# Patient Record
Sex: FEMALE | Race: OTHER | HISPANIC OR LATINO | Employment: FULL TIME | URBAN - METROPOLITAN AREA
[De-identification: names, ages, dates, MRNs, and addresses within clinical notes are randomized per-mention and may not be internally consistent; named-entity substitution may affect disease eponyms.]

---

## 2018-10-30 ENCOUNTER — OFFICE VISIT (OUTPATIENT)
Dept: FAMILY MEDICINE CLINIC | Facility: CLINIC | Age: 38
End: 2018-10-30

## 2018-10-30 VITALS
HEIGHT: 63 IN | OXYGEN SATURATION: 96 % | WEIGHT: 160 LBS | BODY MASS INDEX: 28.35 KG/M2 | DIASTOLIC BLOOD PRESSURE: 82 MMHG | SYSTOLIC BLOOD PRESSURE: 122 MMHG | TEMPERATURE: 98.1 F | RESPIRATION RATE: 16 BRPM | HEART RATE: 90 BPM

## 2018-10-30 DIAGNOSIS — Z00.01 ENCOUNTER FOR WELL ADULT EXAM WITH ABNORMAL FINDINGS: Primary | ICD-10-CM

## 2018-10-30 DIAGNOSIS — E66.3 OVERWEIGHT (BMI 25.0-29.9): ICD-10-CM

## 2018-10-30 DIAGNOSIS — R73.9 HYPERGLYCEMIA: ICD-10-CM

## 2018-10-30 DIAGNOSIS — Z23 NEEDS FLU SHOT: ICD-10-CM

## 2018-10-30 DIAGNOSIS — E78.5 HYPERLIPIDEMIA, UNSPECIFIED HYPERLIPIDEMIA TYPE: ICD-10-CM

## 2018-10-30 PROCEDURE — 99385 PREV VISIT NEW AGE 18-39: CPT | Performed by: NURSE PRACTITIONER

## 2018-10-30 NOTE — ASSESSMENT & PLAN NOTE
Patient is here for physical exam we find this visit without any distress or abnormalities  We recommended exercise at least three and 0 5 hours per week and healthy diet with a low carbohydrate and low saturated fat   Began to follow up in one year for regular physical exams

## 2018-10-30 NOTE — ASSESSMENT & PLAN NOTE
I counseled patient about healthy BMI with benefit of avoiding weight gain to prevent health risks  I recommended increased intake of fruits, vegetables, yogurt, whole grains, and diet soda  Also advised patient on increased physical activity and sleeping 6-8 hours/night  Limiting trans fat, saturated fat, fried foods, sugar and adding low fat foods in her diet

## 2018-10-30 NOTE — PROGRESS NOTES
Assessment/Plan:    Encounter for well adult exam with abnormal findings  Patient is here for physical exam we find this visit without any distress or abnormalities  We recommended exercise at least three and 0 5 hours per week and healthy diet with a low carbohydrate and low saturated fat  Began to follow up in one year for regular physical exams    Overweight (BMI 25 0-29  9)  I counseled patient about healthy BMI with benefit of avoiding weight gain to prevent health risks  I recommended increased intake of fruits, vegetables, yogurt, whole grains, and diet soda  Also advised patient on increased physical activity and sleeping 6-8 hours/night  Limiting trans fat, saturated fat, fried foods, sugar and adding low fat foods in her diet  Diagnoses and all orders for this visit:    Encounter for well adult exam with abnormal findings    Overweight (BMI 25 0-29  9)    Hyperlipidemia, unspecified hyperlipidemia type  -     CBC and differential; Future  -     Lipid panel; Future  -     TSH, 3rd generation with Free T4 reflex; Future    Hyperglycemia  -     Comprehensive metabolic panel; Future  -     Hemoglobin A1C; Future    Needs flu shot  -     SYRINGE/SINGLE-DOSE VIAL: influenza vaccine, [id number], quadrivalent, 0 5 mL, preservative-free, for patients 3+ yr (FLUZONE)          Subjective:      Patient ID: Celinda Riedel is a 41 y o  female  Physical exam  41year old female patient here for a physical exam with bloodwork  Currently has no insurance  Denies any complaints today  The following portions of the patient's history were reviewed and updated as appropriate: allergies, current medications, past family history, past medical history, past social history, past surgical history and problem list     Review of Systems   Constitutional: Negative  HENT: Negative  Eyes: Negative  Respiratory: Negative  Cardiovascular: Negative  Gastrointestinal: Negative  Endocrine: Negative  Genitourinary: Negative  Musculoskeletal: Negative  Skin: Negative  Allergic/Immunologic: Negative  Neurological: Negative  Hematological: Negative  Psychiatric/Behavioral: Negative  Objective:      /82 (BP Location: Left arm, Patient Position: Sitting, Cuff Size: Adult)   Pulse 90   Temp 98 1 °F (36 7 °C) (Oral)   Resp 16   Ht 5' 3" (1 6 m)   Wt 72 6 kg (160 lb)   LMP 10/13/2018 (Approximate)   SpO2 96%   Breastfeeding? No   BMI 28 34 kg/m²          Physical Exam   Constitutional: She is oriented to person, place, and time  She appears well-developed and well-nourished  HENT:   Head: Normocephalic and atraumatic  Right Ear: Hearing, tympanic membrane, external ear and ear canal normal    Left Ear: Hearing, external ear and ear canal normal  A middle ear effusion is present  No decreased hearing is noted  Nose: Nose normal    Mouth/Throat: Uvula is midline, oropharynx is clear and moist and mucous membranes are normal    Eyes: Pupils are equal, round, and reactive to light  Conjunctivae and EOM are normal    Neck: Normal range of motion  Neck supple  Cardiovascular: Normal rate, regular rhythm and normal heart sounds  Pulmonary/Chest: Effort normal and breath sounds normal    Abdominal: Soft  Bowel sounds are normal    Musculoskeletal: Normal range of motion  Neurological: She is alert and oriented to person, place, and time  She has normal reflexes  Skin: Skin is warm and dry  Psychiatric: She has a normal mood and affect  Thought content normal    Nursing note and vitals reviewed

## 2018-10-30 NOTE — PATIENT INSTRUCTIONS
Visita de bienestar para los adultos   LO QUE NECESITA SABER:   ¿Qué es adele visita de bienestar? Jay Jay Charles de bienestar es cuando usted acude con un médico para que le pietro exámenes de detección de problemas de [place]  También puede obtener asesoramiento sobre cómo mantenerse saludable  Anote gio preguntas para que se acuerde de hacerlas  Pregunte a horn médico con qué frecuencia debería realizarse adele visita de bienestar  ¿Qupe sucede en adele visita de bienestar? Horn médico le preguntará sobre horn lydia y horn historia familiar [id number] [facility]  [place] incluye presión arterial sidney, enfermedades del corazón y cáncer  El médico le preguntará si tiene síntomas que le preocupen, si [facility] y [place] de ánimo  También se le preguntará acerca del uso de medicamentos, suplementos, alimentos y alcohol  Es posible que le pietro cualquiera de lo siguiente:  · Horn peso  será revisado  Es posible que [employer] midan horn altura para calcular horn índice de masa corporal [facility])  El [facility] indica si tiene un peso saludable  · Se verificarán horn presión arterial  y frecuencia cardíaca  También pueden revisar horn temperatura  · Exámenes de [place] y [facility]  se podría realizar  Se podrían realizar exámenes de yusra para revisar los niveles de [facility]  Los niveles anormales de colesterol aumentan el riesgo de enfermedad del corazón y accidente cerebrovascular  Puede que también necesite adele prueba de yusra u orina para revisar si tiene diabetes si usted está en mayor riesgo  Las pruebas de orina pueden hacerse para buscar signos de adele infección o adele enfermedad renal      · Un examen físico  incluye la comprobación de gio latidos del corazón y los pulmones con un estetoscopio  Horn médico también podría revisarle la piel para buscar daños causados por el sol  · Pruebas de detección  podría recomendarse  Se realiza un examen de detección para detectar enfermedades que pueden no causar síntomas   Los exámenes de Natalia 'DARRICK' Us necesite dependen de horn edad, género, antecedentes familiares y hábitos de dayana  Por ejemplo, podrían recomendarle la exploración selectiva colorrectal si tiene 48 años o más  ¿Qué exámenes de detección necesito si soy adele kadeem? · El examen de Papanicolaou  se utiliza para detectar cáncer de ananth uterino  El examen del Papanicolaou por lo general se realiza entre 3 a 5 años dependiendo de horn edad  Puede necesitarlo más a menudo si usted ha tenido [employer] de la prueba de Papanicolaou en el pasado  Pregunte a horn médico con qué frecuencia debería realizarse un examen de Papanicolaou  · Adele mamografía  es adele radiografía de goi senos para detectar cáncer de mama  Los expertos recomiendan [address] cada 2 años empezando a los 48 años de [place]  Es probable que usted necesite [address] a los 52 años o antes si tiene riesgo alto de cáncer de seno  Hable con horn médico sobre cuándo debe empezar con gio mamografías y con cuánta frecuencia las necesita  ¿Qué vacunas podría necesitar? · Debe recibir Ifeoma Flurry vacuna contra la influenza  todos los [address] [place] [address]  La vacuna contra la influenza protege de la gripe  Varios tipos de virus causan la influenza  Debido a que los virus [place] con el [place], es necesaria la producción de nuevas vacunas cada año  · Debe recibir Ifeoma Flurry vacuna de refuerzo contra el tétanos-difteria (Td)  cada 10 años  Esta vacuna protege contra el tétanos y la difteria  El tétanos es adele infección severa que puede causar trismo y espasmos musculares dolorosos  La difteria es adele infección bacterial grave que produce adele cubierta gruesa en la parte de atrás de la boca y garganta  · Debe recibir la vacuna contra el virus del papiloma humano (VPH)  si usted es kadeem y [place] 19 y 32 años o es hombre y [place] 23 y 24 años y nunca la recibió  Esta vacuna protege contra la infección por VPH   El virus del papiloma humano o VPH es la infección más común que se transmite por contacto sexual  El virus del papiloma humano también podría provocar cáncer vaginal, del pene y del ano  · Debe recibir la vacuna antineumocócica  si tiene más de 72 años  La vacuna antineumocócica es adele inyección que se administra para protegerlo contra adele enfermedad neumocócica  La enfermedad neumocócica es adele infección causada por la bacteria neumocócica  La infección puede causar neumonía, meningitis o adele infección del oído  · Debe recibir la vacuna contra la culebrilla  si tiene [address] [place] [address],[address] [place] [address] o [place], incluso si pisano tenido culebrilla antes  La vacuna contra la culebrilla (herpes zóster) es adele inyección usada para proteger contra el virus zóster que causa la varicela  Danita es el mismo virus que causa la varicela  La culebrilla es un sarpullido doloroso que se desarrolla en personas que tuvieron varicela o pisano estado expuestas al virus  ¿Cómo puedo comer de manera saludable? [place] es un modelo para planear comidas sanas  Muestra los tipos y cantidades de alimentos que deberían ir en un plato  [place] Sa[place] y verduras representan alrededor de la mitad de horn plato y los granos y proteínas representan la otra mitad  Se incluye adele porción de productos lácteos al lado del plato  La cantidad de calorías y [address] de las porciones que usted necesita dependen de horn edad, [place], peso y altura  Los ejemplos de alimentos saludables son:  · Consuma adele variedad de verduras  breana las de color corin oscuro, gupta y [place]  Usted también puede incluir verduras enlatadas bajas en sodio (sal) y verduras congeladas sin mantequilla ni salsas LHICELGM  · Consuma adele variedad de fruta frescas , las frutas enlatadas en 100% de jugo , fruta [place] y trina secos  · Incluya granos enteros  Por lo menos la mitad de los granos que usted consume deben ser granos de ev integral  Por ejemplo, panes de grano entero, pasta integral, arroz integral y cereales de grano entero breana la prasad      · Consuma adele variedad de alimentos con proteínas breana mariscos (pescado y crustáceos), [place] y carne de ave sin piel (pavo y alanis)  Ejemplos de tequila magras incluyen pierna, paleta o lomo de puerco y arian, solomillo o, lomo de res y carne [place] de res extra [place]  Otros alimentos ricos en proteínas son los huevos y sustitutos de [place], frijoles, chícharos, productos de soya, nueces y semillas  · Elija productos lácteos bajos en grasa [employer] o del 1% o yogur, queso y requesón bajos en grasa  · Limite las grasas poco saludables,  breana la [place], la margarina dura y la [place]  ¿Qué cantidad de actividad física necesito? Realice adele actividad física por lo menos 30 minutos al día, la mayoría de los días de la [place]  Algunos de los ejercicios incluyen caminar, montar en bicicleta, bailar y nadar  Usted también puede realizar más actividad física usando las escaleras en vez de los ascensores o estacionarse más lejos cuando [place] a las tiendas  Incluya ejercicios para fortalecer los músculos 2 días a la semana  El ejercicio regular proporciona muchos beneficios para la lydia  [place] a controlar horn peso y [employer] riesgo de diabetes tipo 2, presión arterial sidney, enfermedad del corazón y accidente cerebrovascular  El ejercicio [employer] puede ayudar a mejorar horn estado de ánimo  Pregunte a horn médico acerca del mejor plan de ejercicio para usted  ¿Cuáles son Mehrdad Pale generales de lydia y seguridad que emre seguir? · No fume  La nicotina y otras sustancias químicas que contienen los cigarrillos y cigarros pueden dañar los pulmones  Pida información a horn médico si usted actualmente fuma y necesita ayuda para dejar de fumar  Los cigarrillos electrónicos o tabaco sin humo todavía contienen nicotina  Consulte con horn médico antes de [employer]  · Limite el consumo de alcohol  Un trago equivale a 12 onzas de cerveza, 5 onzas de vino o 1 onza y ½ de licor      · Baje de peso, si es necesario  El sobrepeso aumenta el riesgo de ciertas condiciones de [place]  Estos incluyen enfermedad del corazón, presión arterial sidney, diabetes tipo 2 y ciertos tipos de cáncer  · Proteja horn piel  No tome el sol ni use yady de bronceado  Use protector solar con un SPF 13 o mayor  Aplíquese el bloqueador por lo menos 15 minutos antes de que vaya a estar al [place]  Vuelva a aplicarse la crema solar cada 2 horas  Use ropa protectora, sombrero y lentes para el sol cuando se encuentre afuera  · Conduzca con seguridad  Use siempre horn cinturón de seguridad  Asegúrese que todos en el nick usan el cinturón de seguridad  Un cinturón de seguridad puede salvar horn dayana en troy de un accidente automovilístico  No use el celular cuando esté manejando  [place] puede hacer que se distraiga y causar un accidente  Es mejor que pare y se orille si necesita hacer adele Lesvia Glenn un texto  · Practique el sexo seguro  Use condones de látex si es sexualmente [place] y tiene más de [place] and [place]  Horn médico puede recomendar exámenes de detección de infecciones de transmisión sexual (ITS)  · Use un poppy, un chaleco salvavidas y unos implementos de protección  Siempre use un poppy al [employer] en bicicleta o motocicleta, esquiar o jugar deportes que podrían causar adele lesión en la jazlyn  Use implementos de protección cuando practique deportes  Use un chaleco salvavidas cuando esté en un bote o practicando actividades acuáticas  ACUERDOS SOBRE HORN CUIDADO:   Usted tiene el derecho de ayudar a planear horn cuidado  Aprenda todo lo que pueda sobre horn condición y breana darle tratamiento  Discuta gio opciones de tratamiento con gio médicos para decidir el cuidado que usted desea recibir  Usted siempre tiene el derecho de rechazar el tratamiento  Esta información es sólo para uso en educación  Horn intención no es darle un consejo médico sobre enfermedades o tratamientos   Colsulte con horn médico, enfermera o farmacéutico antes de seguir cualquier régimen médico para saber si es seguro y efectivo para usted  © 2017 [address] Information is for End User's use only and may not be sold, redistributed or otherwise used for commercial purposes  All illustrations and images included in CareNotes® are the copyrighted property of A D A M , Inc  or Shekhar Story

## 2018-11-05 ENCOUNTER — TELEPHONE (OUTPATIENT)
Dept: FAMILY MEDICINE CLINIC | Facility: CLINIC | Age: 38
End: 2018-11-05

## 2020-03-05 ENCOUNTER — OFFICE VISIT (OUTPATIENT)
Dept: FAMILY MEDICINE CLINIC | Facility: CLINIC | Age: 40
End: 2020-03-05

## 2020-03-05 VITALS
BODY MASS INDEX: 27.86 KG/M2 | OXYGEN SATURATION: 97 % | SYSTOLIC BLOOD PRESSURE: 100 MMHG | HEIGHT: 64 IN | DIASTOLIC BLOOD PRESSURE: 64 MMHG | WEIGHT: 163.2 LBS | HEART RATE: 77 BPM | TEMPERATURE: 98.7 F

## 2020-03-05 DIAGNOSIS — Z00.01 ENCOUNTER FOR WELL ADULT EXAM WITH ABNORMAL FINDINGS: Primary | ICD-10-CM

## 2020-03-05 DIAGNOSIS — E66.3 OVERWEIGHT (BMI 25.0-29.9): ICD-10-CM

## 2020-03-05 PROCEDURE — 99395 PREV VISIT EST AGE 18-39: CPT | Performed by: NURSE PRACTITIONER

## 2020-03-05 NOTE — ASSESSMENT & PLAN NOTE
Health risk assessment was discussed with patient also and the ways to stay healthier  Recommended a healthy diet and exercising frequently will help to control better patient's current chronic conditions; Immunizations, and the need to compliance with current CDC's recommendations  For follow up in 1 year for regular physical exams

## 2020-03-05 NOTE — PROGRESS NOTES
Assessment/Plan:    Encounter for well adult exam with abnormal findings  Health risk assessment was discussed with patient also and the ways to stay healthier  Recommended a healthy diet and exercising frequently will help to control better patient's current chronic conditions; Immunizations, and the need to compliance with current CDC's recommendations  For follow up in 1 year for regular physical exams  Overweight (BMI 25 0-29 9)  -To continue keeping active and diet modifications  Healthy food choices advised and joining a gym  Diagnoses and all orders for this visit:    Encounter for well adult exam with abnormal findings    Overweight (BMI 25 0-29  9)    Other orders  -     Cancel: HIV 1/2 Antigen/Antibody (4th Generation) w Reflex [facility]; Future  -     Cancel: Ambulatory referral to Obstetrics / Gynecology; Future          Subjective:      Patient ID: Caesar Toure is a 45 y o  female  45year old female patient here to have a physical exam  Patient denies any complaint today  Has been up to date on her dental and eye exams  LMP: 2/2020  Has been eating healthy but cannot lose weight  I advised joining a gym as well to help with weight loss journey  The following portions of the patient's history were reviewed and updated as appropriate: allergies, current medications, past family history, past medical history, past social history, past surgical history and problem list   BMI Counseling: Body mass index is 28 46 kg/m²  The BMI is above normal  Nutrition recommendations include encouraging healthy choices of fruits and vegetables, decreasing fast food intake, consuming healthier snacks, limiting drinks that contain sugar, increasing intake of lean protein, reducing intake of saturated and trans fat and reducing intake of cholesterol  Exercise recommendations include moderate physical activity 150 minutes/week         Depression Screening and Follow-up Plan: Patient's depression screening was positive with a PHQ-2 score of 0  Clincally patient does not have depression  No treatment is required  Review of Systems   Constitutional: Positive for appetite change and unexpected weight change  Negative for diaphoresis and fatigue  HENT: Negative  Negative for congestion  Eyes: Negative  Respiratory: Negative  Negative for cough, chest tightness, shortness of breath and wheezing  Cardiovascular: Negative  Negative for chest pain and palpitations  Gastrointestinal: Negative  Negative for abdominal distention and anal bleeding  Endocrine: Negative  Genitourinary: Negative  Negative for difficulty urinating and dysuria  Musculoskeletal: Negative  Negative for arthralgias and gait problem  Skin: Negative  Allergic/Immunologic: Negative  Neurological: Negative  Negative for dizziness and headaches  Hematological: Negative  Negative for adenopathy  Does not bruise/bleed easily  Psychiatric/Behavioral: Negative  Negative for agitation and confusion  Objective:      /64 (BP Location: Left arm, Patient Position: Sitting, Cuff Size: Adult)   Pulse 77   Temp 98 7 °F (37 1 °C) (Oral)   Ht 5' 3 5" (1 613 m)   Wt 74 kg (163 lb 3 2 oz)   LMP 02/18/2020 (Exact Date)   SpO2 97%   BMI 28 46 kg/m²          Physical Exam   Constitutional: She is oriented to person, place, and time  She appears well-developed and well-nourished  No distress  HENT:   Head: Normocephalic and atraumatic  Right Ear: External ear normal    Left Ear: External ear normal    Nose: Nose normal    Mouth/Throat: Oropharynx is clear and moist  No oropharyngeal exudate  Eyes: Pupils are equal, round, and reactive to light  Conjunctivae and EOM are normal    Neck: Normal range of motion  Neck supple  Cardiovascular: Normal rate, regular rhythm, normal heart sounds and intact distal pulses  Pulmonary/Chest: Effort normal and breath sounds normal  No respiratory distress   She has no wheezes  She has no rales  Abdominal: Soft  Bowel sounds are normal    Musculoskeletal: Normal range of motion  Lymphadenopathy:     She has no cervical adenopathy  Neurological: She is alert and oriented to person, place, and time  She has normal reflexes  Skin: Skin is warm and dry  Capillary refill takes less than 2 seconds  She is not diaphoretic  Psychiatric: She has a normal mood and affect  Her behavior is normal  Thought content normal    Nursing note and vitals reviewed

## 2020-04-08 ENCOUNTER — TELEMEDICINE (OUTPATIENT)
Dept: FAMILY MEDICINE CLINIC | Facility: CLINIC | Age: 40
End: 2020-04-08
Payer: OTHER GOVERNMENT

## 2020-04-08 DIAGNOSIS — Z20.828 EXPOSURE TO SARS-ASSOCIATED CORONAVIRUS: Primary | ICD-10-CM

## 2020-04-08 PROCEDURE — G2012 BRIEF CHECK IN BY MD/QHP: HCPCS | Performed by: FAMILY MEDICINE

## 2020-09-08 ENCOUNTER — TELEPHONE (OUTPATIENT)
Dept: ADMINISTRATIVE | Facility: OTHER | Age: 40
End: 2020-09-08

## 2020-09-08 NOTE — TELEPHONE ENCOUNTER
Upon review of the In Basket request we were able to locate, review, and update the patient chart as requested for Pap Smear (HPV) aka Cervical Cancer Screening  Any additional questions or concerns should be emailed to the Practice Liaisons via [email or web address] email, please do not reply via In Basket      Thank you  Gerry Kong

## 2020-09-08 NOTE — TELEPHONE ENCOUNTER
----- Message from Sarath Lagunas sent at 9/8/2020  9:51 AM EDT -----  Regarding: PAP  09/08/20 9:51 AM    Hello, our patient Victor Hugo Zuniga has had Pap Smear (HPV) aka Cervical Cancer Screening completed/performed  Please assist in updating the patient chart by pulling the Care Everywhere (CE) document  The date of service is 03/05/2020       Thank you,  Sarath Lagunas   [address]  [address]

## 2020-09-09 ENCOUNTER — OFFICE VISIT (OUTPATIENT)
Dept: FAMILY MEDICINE CLINIC | Facility: CLINIC | Age: 40
End: 2020-09-09

## 2020-09-09 VITALS
HEIGHT: 64 IN | HEART RATE: 64 BPM | DIASTOLIC BLOOD PRESSURE: 70 MMHG | OXYGEN SATURATION: 98 % | WEIGHT: 161 LBS | SYSTOLIC BLOOD PRESSURE: 106 MMHG | TEMPERATURE: 97.9 F | BODY MASS INDEX: 27.49 KG/M2 | RESPIRATION RATE: 16 BRPM

## 2020-09-09 DIAGNOSIS — M54.10 RADICULAR PAIN OF LEFT LOWER EXTREMITY: Primary | ICD-10-CM

## 2020-09-09 PROCEDURE — 99214 OFFICE O/P EST MOD 30 MIN: CPT | Performed by: FAMILY MEDICINE

## 2020-09-09 RX ORDER — GABAPENTIN 100 MG/1
100 CAPSULE ORAL
Qty: 30 CAPSULE | Refills: 0 | Status: SHIPPED | OUTPATIENT
Start: 2020-09-09 | End: 2022-01-04 | Stop reason: ALTCHOICE
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
## 2020-12-27 ENCOUNTER — OFFICE VISIT (OUTPATIENT)
Dept: URGENT CARE | Age: 40
End: 2020-12-27

## 2020-12-27 VITALS
RESPIRATION RATE: 16 BRPM | OXYGEN SATURATION: 97 % | TEMPERATURE: 98 F | HEIGHT: 63 IN | WEIGHT: 150 LBS | BODY MASS INDEX: 26.58 KG/M2 | HEART RATE: 92 BPM

## 2020-12-27 DIAGNOSIS — B34.9 VIRAL ILLNESS: Primary | ICD-10-CM

## 2020-12-27 PROCEDURE — G0382 LEV 3 HOSP TYPE B ED VISIT: HCPCS | Performed by: PHYSICIAN ASSISTANT

## 2020-12-27 PROCEDURE — U0003 INFECTIOUS AGENT DETECTION BY NUCLEIC ACID (DNA OR RNA); SEVERE ACUTE RESPIRATORY SYNDROME CORONAVIRUS 2 (SARS-COV-2) (CORONAVIRUS DISEASE [COVID-19]), AMPLIFIED PROBE TECHNIQUE, MAKING USE OF HIGH THROUGHPUT TECHNOLOGIES AS DESCRIBED BY CMS-2020-01-R: HCPCS | Performed by: PHYSICIAN ASSISTANT

## 2020-12-29 ENCOUNTER — TELEPHONE (OUTPATIENT)
Dept: URGENT CARE | Age: 40
End: 2020-12-29

## 2020-12-29 LAB — SARS-COV-2 RNA SPEC QL NAA+PROBE: DETECTED

## 2021-03-04 ENCOUNTER — OFFICE VISIT (OUTPATIENT)
Dept: FAMILY MEDICINE CLINIC | Facility: CLINIC | Age: 41
End: 2021-03-04
Payer: COMMERCIAL

## 2021-03-04 ENCOUNTER — LAB (OUTPATIENT)
Dept: LAB | Facility: HOSPITAL | Age: 41
End: 2021-03-04
Payer: COMMERCIAL

## 2021-03-04 VITALS
HEIGHT: 63 IN | OXYGEN SATURATION: 96 % | BODY MASS INDEX: 29.3 KG/M2 | TEMPERATURE: 97.9 F | HEART RATE: 86 BPM | SYSTOLIC BLOOD PRESSURE: 112 MMHG | WEIGHT: 165.4 LBS | DIASTOLIC BLOOD PRESSURE: 64 MMHG

## 2021-03-04 DIAGNOSIS — Z00.00 ANNUAL PHYSICAL EXAM: Primary | ICD-10-CM

## 2021-03-04 DIAGNOSIS — R79.89 ABNORMAL TSH: ICD-10-CM

## 2021-03-04 DIAGNOSIS — R73.9 HYPERGLYCEMIA: ICD-10-CM

## 2021-03-04 DIAGNOSIS — B35.1 TOENAIL FUNGUS: ICD-10-CM

## 2021-03-04 DIAGNOSIS — E78.2 MIXED HYPERLIPIDEMIA: ICD-10-CM

## 2021-03-04 DIAGNOSIS — Z12.31 ENCOUNTER FOR SCREENING MAMMOGRAM FOR MALIGNANT NEOPLASM OF BREAST: ICD-10-CM

## 2021-03-04 DIAGNOSIS — R53.83 OTHER FATIGUE: ICD-10-CM

## 2021-03-04 DIAGNOSIS — Z11.4 ENCOUNTER FOR SCREENING FOR HIV: ICD-10-CM

## 2021-03-04 LAB
ALBUMIN SERPL BCP-MCNC: 4.6 G/DL (ref 3–5.2)
ALP SERPL-CCNC: 62 U/L (ref 43–122)
ALT SERPL W P-5'-P-CCNC: 29 U/L (ref 9–52)
ANION GAP SERPL CALCULATED.3IONS-SCNC: 7 MMOL/L (ref 5–14)
AST SERPL W P-5'-P-CCNC: 28 U/L (ref 14–36)
BASOPHILS # BLD AUTO: 0 THOUSANDS/ΜL (ref 0–0.1)
BASOPHILS NFR BLD AUTO: 1 % (ref 0–1)
BILIRUB SERPL-MCNC: 0.7 MG/DL
BUN SERPL-MCNC: 12 MG/DL (ref 5–25)
CALCIUM SERPL-MCNC: 8.8 MG/DL (ref 8.4–10.2)
CHLORIDE SERPL-SCNC: 102 MMOL/L (ref 97–108)
CHOLEST SERPL-MCNC: 166 MG/DL
CO2 SERPL-SCNC: 29 MMOL/L (ref 22–30)
CREAT SERPL-MCNC: 0.65 MG/DL (ref 0.6–1.2)
EOSINOPHIL # BLD AUTO: 0.1 THOUSAND/ΜL (ref 0–0.4)
EOSINOPHIL NFR BLD AUTO: 2 % (ref 0–6)
ERYTHROCYTE [DISTWIDTH] IN BLOOD BY AUTOMATED COUNT: 13.7 %
GFR SERPL CREATININE-BSD FRML MDRD: 111 ML/MIN/1.73SQ M
GLUCOSE P FAST SERPL-MCNC: 99 MG/DL (ref 70–99)
HCT VFR BLD AUTO: 37 % (ref 36–46)
HDLC SERPL-MCNC: 49 MG/DL
HGB BLD-MCNC: 12.2 G/DL (ref 12–16)
LDLC SERPL CALC-MCNC: 106 MG/DL
LYMPHOCYTES # BLD AUTO: 1.5 THOUSANDS/ΜL (ref 0.5–4)
LYMPHOCYTES NFR BLD AUTO: 29 % (ref 25–45)
MCH RBC QN AUTO: 28.9 PG (ref 26–34)
MCHC RBC AUTO-ENTMCNC: 32.8 G/DL (ref 31–36)
MCV RBC AUTO: 88 FL (ref 80–100)
MONOCYTES # BLD AUTO: 0.5 THOUSAND/ΜL (ref 0.2–0.9)
MONOCYTES NFR BLD AUTO: 10 % (ref 1–10)
NEUTROPHILS # BLD AUTO: 2.9 THOUSANDS/ΜL (ref 1.8–7.8)
NEUTS SEG NFR BLD AUTO: 59 % (ref 45–65)
NONHDLC SERPL-MCNC: 117 MG/DL
PLATELET # BLD AUTO: 258 THOUSANDS/UL (ref 150–450)
PMV BLD AUTO: 8.3 FL (ref 8.9–12.7)
POTASSIUM SERPL-SCNC: 4.1 MMOL/L (ref 3.6–5)
PROT SERPL-MCNC: 8 G/DL (ref 5.9–8.4)
RBC # BLD AUTO: 4.21 MILLION/UL (ref 4–5.2)
SODIUM SERPL-SCNC: 138 MMOL/L (ref 137–147)
TRIGL SERPL-MCNC: 57 MG/DL
TSH SERPL DL<=0.05 MIU/L-ACNC: 1.33 UIU/ML (ref 0.47–4.68)
WBC # BLD AUTO: 5 THOUSAND/UL (ref 4.5–11)

## 2021-03-04 PROCEDURE — 80053 COMPREHEN METABOLIC PANEL: CPT

## 2021-03-04 PROCEDURE — 84443 ASSAY THYROID STIM HORMONE: CPT

## 2021-03-04 PROCEDURE — 85025 COMPLETE CBC W/AUTO DIFF WBC: CPT

## 2021-03-04 PROCEDURE — 3725F SCREEN DEPRESSION PERFORMED: CPT | Performed by: NURSE PRACTITIONER

## 2021-03-04 PROCEDURE — 87389 HIV-1 AG W/HIV-1&-2 AB AG IA: CPT

## 2021-03-04 PROCEDURE — 36415 COLL VENOUS BLD VENIPUNCTURE: CPT

## 2021-03-04 PROCEDURE — 80061 LIPID PANEL: CPT

## 2021-03-04 PROCEDURE — 99396 PREV VISIT EST AGE 40-64: CPT | Performed by: NURSE PRACTITIONER

## 2021-03-04 PROCEDURE — 3008F BODY MASS INDEX DOCD: CPT | Performed by: NURSE PRACTITIONER

## 2021-03-04 NOTE — ASSESSMENT & PLAN NOTE
In 3/2020 pt was advised to have repeat TSH but was never done  Today we are ordering to patient to have a TSH done as this can be cause of weight gain for her

## 2021-03-04 NOTE — ASSESSMENT & PLAN NOTE
-Discussed with patient that main goal is to eradicated the fungal infection  Patient with multiple failed treatment with OTC remedies and home remedies  Today we are ordering lab works to check liver enzymes  I offered patient starting on oral anti-fungal patient refused at this time and would prefer to see podiatry  Patient in agreement with plan  I also discussed with patient to prevent recurrence to alternate footwear, avoiding walking barefoot in public area, avoidance of trauma to infected nails

## 2021-03-04 NOTE — PROGRESS NOTES
[address] [facility] PRIMARY CARE [facility]    NAME: Trevor Frost  AGE: 37 y o  SEX: female  : 1980     DATE: 3/4/2021     Assessment and Plan:     Problem List Items Addressed This Visit        Musculoskeletal and Integument    Toenail fungus     -Discussed with patient that main goal is to eradicated the fungal infection  Patient with multiple failed treatment with OTC remedies and home remedies  Today we are ordering lab works to check liver enzymes  I offered patient starting on oral anti-fungal patient refused at this time and would prefer to see podiatry  Patient in agreement with plan  I also discussed with patient to prevent recurrence to alternate footwear, avoiding walking barefoot in public area, avoidance of trauma to infected nails  Relevant Orders    Ambulatory referral to Podiatry       Other    Annual physical exam - Primary     -Patient recommended healthy eating habits  Health risk assessment was discussed with patient also and the ways to stay healthier  Recommended a exercising frequently at least 5 days a week for 30 minutes at a time; such as joining a gym if not already enrolled or walking  Eating low-fat and low-cholesterol foods with avoidance of saturated fats or fried foods  Immunizations, and the need to comply with current CDC's recommendations were discussed  To follow up yearly for physical exams  Abnormal TSH     In 3/2020 pt was advised to have repeat TSH but was never done  Today we are ordering to patient to have a TSH done as this can be cause of weight gain for her            Relevant Orders    TSH, 3rd generation    Encounter for screening mammogram for malignant neoplasm of breast    Relevant Orders    Mammo screening bilateral w 3d & cad    Mixed hyperlipidemia    Relevant Orders    Lipid panel    Hyperglycemia    Relevant Orders    Comprehensive metabolic panel    Other fatigue Relevant Orders    CBC and differential    Encounter for screening for HIV    Relevant Orders    HIV 1/2 Antigen/Antibody (4th Generation) w Reflex [facility]          Immunizations and preventive care screenings were discussed with patient today  Appropriate education was printed on patient's after visit summary  Counseling:  Alcohol/drug use: discussed moderation in alcohol intake, the recommendations for healthy alcohol use, and avoidance of illicit drug use  Dental Health: discussed importance of regular tooth brushing, flossing, and dental visits  Injury prevention: discussed safety/seat belts, safety helmets, smoke detectors, carbon dioxide detectors, and smoking near bedding or upholstery  Sexual health: discussed sexually transmitted diseases, partner selection, use of condoms, avoidance of unintended pregnancy, and contraceptive alternatives  · Exercise: the importance of regular exercise/physical activity was discussed  Recommend exercise 3-5 times per week for at least 30 minutes  BMI Counseling: Body mass index is 29 3 kg/m²  The BMI is above normal  Nutrition recommendations include encouraging healthy choices of fruits and vegetables, decreasing fast food intake, consuming healthier snacks, limiting drinks that contain sugar, increasing intake of lean protein, reducing intake of saturated and trans fat and reducing intake of cholesterol  Exercise recommendations include exercising 3-5 times per week  Depression Screening and Follow-up Plan: Patient's depression screening was positive with a PHQ-2 score of 0  Clincally patient does not have depression  No treatment is required  No follow-ups on file  Chief Complaint:     Chief Complaint   Patient presents with    Physical Exam      History of Present Illness:     Adult Annual Physical   Patient here for a comprehensive physical exam  The patient reports no problems      Diet and Physical Activity  · Diet/Nutrition: well balanced diet, limited junk food and consuming 3-5 servings of fruits/vegetables daily  · Exercise: no formal exercise  Depression Screening  PHQ-9 Depression Screening    PHQ-9:   Frequency of the following problems over the past two weeks:      Little interest or pleasure in doing things: 0 - not at all  Feeling down, depressed, or hopeless: 0 - not at all  PHQ-2 Score: 0       General Health  · Sleep: gets 7-8 hours of sleep on average  · Hearing: normal - bilateral   · Vision: no vision problems  · Dental: no dental visits for >1 year and brushes teeth once daily  /GYN Health  · Patient is: perimenopausal  · Last menstrual period: 3/4/21  · Contraceptive method: tubal ligation  Review of Systems:     Review of Systems   Constitutional: Positive for unexpected weight change (fluctuation)  Negative for appetite change and fatigue  HENT: Negative  Eyes: Negative  Respiratory: Negative  Negative for cough, chest tightness, shortness of breath and wheezing  Cardiovascular: Negative  Negative for chest pain and palpitations  Gastrointestinal: Negative  Endocrine: Negative  Genitourinary: Negative  Musculoskeletal: Negative  Skin: Positive for rash (right big toe with fungus)  Allergic/Immunologic: Negative  Neurological: Negative  Negative for dizziness and headaches  Hematological: Negative  Psychiatric/Behavioral: Negative  Past Medical History:     No past medical history on file     Past Surgical History:     Past Surgical History:   Procedure Laterality Date    BACK SURGERY      ESOPHAGOGASTRODUODENOSCOPY  04/2015    gastritis      Social History:        Social History     Socioeconomic History    Marital status: Single     Spouse name: Not on file    Number of children: Not on file    Years of education: Not on file    Highest education level: Not on file   Occupational History    Not on file   Social Needs    Financial resource strain: Not on file  Food insecurity     Worry: Not on file     Inability: Not on file    Transportation needs     Medical: Not on file     Non-medical: Not on file   Tobacco Use    Smoking status: Never Smoker    Smokeless tobacco: Never Used   Substance and Sexual Activity    Alcohol use: No    Drug use: No    Sexual activity: Yes     Partners: Male     Birth control/protection: None   Lifestyle    Physical activity     Days per week: Not on file     Minutes per session: Not on file    Stress: Not on file   Relationships    Social connections     Talks on phone: Not on file     Gets together: Not on file     Attends Druze service: Not on file     Active member of club or organization: Not on file     Attends meetings of clubs or organizations: Not on file     Relationship status: Not on file    Intimate partner violence     Fear of current or ex partner: Not on file     Emotionally abused: Not on file     Physically abused: Not on file     Forced sexual activity: Not on file   Other Topics Concern    Not on file   Social History Narrative    Not on file      Family History:     Family History   Problem Relation Age of Onset    Diabetes Maternal Grandmother       Current Medications:     Current Outpatient Medications   Medication Sig Dispense Refill    gabapentin (NEURONTIN) 100 mg capsule Take 1 capsule (100 mg total) by mouth daily at bedtime (Patient not taking: Reported on 12/27/2020) 30 capsule 0     No current facility-administered medications for this visit  Allergies: Allergies   Allergen Reactions    No Known Allergies       Physical Exam:     /64 (BP Location: Left arm, Patient Position: Sitting, Cuff Size: Adult)   Pulse 86   Temp 97 9 °F (36 6 °C) (Oral)   Ht 5' 3" (1 6 m)   Wt 75 kg (165 lb 6 4 oz)   LMP 03/04/2021 (Exact Date)   SpO2 96%   BMI 29 30 kg/m²     Physical Exam  Vitals signs and nursing note reviewed     Constitutional:       General: She is not in acute distress  Appearance: Normal appearance  She is not ill-appearing, toxic-appearing or diaphoretic  HENT:      Head: Normocephalic and atraumatic  Right Ear: Tympanic membrane, ear canal and external ear normal  There is no impacted cerumen  Left Ear: Tympanic membrane, ear canal and external ear normal  There is no impacted cerumen  Nose: Nose normal  No congestion or rhinorrhea  Mouth/Throat:      Mouth: Mucous membranes are moist       Pharynx: Oropharynx is clear  No oropharyngeal exudate or posterior oropharyngeal erythema  Eyes:      Extraocular Movements: Extraocular movements intact  Conjunctiva/sclera: Conjunctivae normal       Pupils: Pupils are equal, round, and reactive to light  Neck:      Musculoskeletal: Normal range of motion and neck supple  Cardiovascular:      Rate and Rhythm: Normal rate and regular rhythm  Pulses: Normal pulses  Heart sounds: Normal heart sounds  Pulmonary:      Effort: Pulmonary effort is normal       Breath sounds: Normal breath sounds  Abdominal:      General: Bowel sounds are normal       Palpations: Abdomen is soft  Musculoskeletal: Normal range of motion  General: No tenderness or deformity  Skin:     General: Skin is warm and dry  Capillary Refill: Capillary refill takes less than 2 seconds  Comments: Right big toe noted to be mildly discolored with yellowing  Neurological:      General: No focal deficit present  Mental Status: She is alert and oriented to person, place, and time     Psychiatric:         Mood and Affect: Mood normal          Behavior: Behavior normal          Judgment: Judgment normal           LISANDRA Clemens  [address]

## 2021-03-04 NOTE — PATIENT INSTRUCTIONS

## 2021-03-05 LAB — HIV 1+2 AB+HIV1 P24 AG SERPL QL IA: NORMAL

## 2021-03-06 NOTE — RESULT ENCOUNTER NOTE
Dear Gorge Palm, I just reviewed your Recent labs and I can report you that your labs are in Normal limits

## 2021-03-10 DIAGNOSIS — R63.5 WEIGHT GAIN: Primary | ICD-10-CM

## 2021-03-10 RX ORDER — PHENTERMINE HYDROCHLORIDE 15 MG/1
15 CAPSULE ORAL EVERY MORNING
Qty: 30 CAPSULE | Refills: 2 | Status: SHIPPED | OUTPATIENT
Start: 2021-03-10 | End: 2021-12-01

## 2021-04-06 DIAGNOSIS — Z23 ENCOUNTER FOR IMMUNIZATION: ICD-10-CM

## 2021-04-12 ENCOUNTER — IMMUNIZATIONS (OUTPATIENT)
Dept: FAMILY MEDICINE CLINIC | Facility: HOSPITAL | Age: 41
End: 2021-04-12

## 2021-04-12 DIAGNOSIS — Z23 ENCOUNTER FOR IMMUNIZATION: Primary | ICD-10-CM

## 2021-04-12 PROCEDURE — 91301 SARS-COV-2 / COVID-19 MRNA VACCINE (MODERNA) 100 MCG: CPT

## 2021-04-12 PROCEDURE — 0011A SARS-COV-2 / COVID-19 MRNA VACCINE (MODERNA) 100 MCG: CPT

## 2021-05-13 ENCOUNTER — IMMUNIZATIONS (OUTPATIENT)
Dept: FAMILY MEDICINE CLINIC | Facility: HOSPITAL | Age: 41
End: 2021-05-13

## 2021-05-13 DIAGNOSIS — Z23 ENCOUNTER FOR IMMUNIZATION: Primary | ICD-10-CM

## 2021-05-13 PROCEDURE — 91301 SARS-COV-2 / COVID-19 MRNA VACCINE (MODERNA) 100 MCG: CPT

## 2021-05-13 PROCEDURE — 0012A SARS-COV-2 / COVID-19 MRNA VACCINE (MODERNA) 100 MCG: CPT

## 2021-05-24 ENCOUNTER — HOSPITAL ENCOUNTER (OUTPATIENT)
Dept: MAMMOGRAPHY | Facility: CLINIC | Age: 41
Discharge: HOME | End: 2021-05-24
Payer: COMMERCIAL

## 2021-05-24 VITALS — HEIGHT: 63 IN | WEIGHT: 165 LBS | BODY MASS INDEX: 29.23 KG/M2

## 2021-05-24 DIAGNOSIS — Z12.31 ENCOUNTER FOR SCREENING MAMMOGRAM FOR MALIGNANT NEOPLASM OF BREAST: ICD-10-CM

## 2021-05-24 PROCEDURE — 77067 SCR MAMMO BI INCL CAD: CPT

## 2021-05-24 PROCEDURE — 77063 BREAST TOMOSYNTHESIS BI: CPT

## 2021-12-01 ENCOUNTER — OFFICE VISIT (OUTPATIENT)
Dept: FAMILY MEDICINE CLINIC | Facility: CLINIC | Age: 41
End: 2021-12-01
Payer: COMMERCIAL

## 2021-12-01 VITALS
BODY MASS INDEX: 26.93 KG/M2 | WEIGHT: 152 LBS | HEIGHT: 63 IN | DIASTOLIC BLOOD PRESSURE: 80 MMHG | HEART RATE: 74 BPM | RESPIRATION RATE: 18 BRPM | TEMPERATURE: 97.7 F | SYSTOLIC BLOOD PRESSURE: 122 MMHG | OXYGEN SATURATION: 99 %

## 2021-12-01 DIAGNOSIS — G89.29 CHRONIC MIDLINE LOW BACK PAIN WITH LEFT-SIDED SCIATICA: ICD-10-CM

## 2021-12-01 DIAGNOSIS — M72.2 PLANTAR FASCIITIS OF LEFT FOOT: Primary | ICD-10-CM

## 2021-12-01 DIAGNOSIS — Z23 NEEDS FLU SHOT: ICD-10-CM

## 2021-12-01 DIAGNOSIS — Z11.59 ENCOUNTER FOR HEPATITIS C SCREENING TEST FOR LOW RISK PATIENT: ICD-10-CM

## 2021-12-01 DIAGNOSIS — M54.42 CHRONIC MIDLINE LOW BACK PAIN WITH LEFT-SIDED SCIATICA: ICD-10-CM

## 2021-12-01 PROCEDURE — 3725F SCREEN DEPRESSION PERFORMED: CPT | Performed by: NURSE PRACTITIONER

## 2021-12-01 PROCEDURE — 3008F BODY MASS INDEX DOCD: CPT | Performed by: NURSE PRACTITIONER

## 2021-12-01 PROCEDURE — 99214 OFFICE O/P EST MOD 30 MIN: CPT | Performed by: NURSE PRACTITIONER

## 2021-12-01 PROCEDURE — 90686 IIV4 VACC NO PRSV 0.5 ML IM: CPT | Performed by: NURSE PRACTITIONER

## 2021-12-01 PROCEDURE — 90471 IMMUNIZATION ADMIN: CPT | Performed by: NURSE PRACTITIONER

## 2021-12-01 RX ORDER — TOPIRAMATE 25 MG/1
25 TABLET ORAL 2 TIMES DAILY
COMMUNITY
Start: 2021-09-22 | End: 2021-12-01

## 2021-12-01 RX ORDER — PREDNISONE 10 MG/1
TABLET ORAL
Qty: 21 TABLET | Refills: 0 | Status: SHIPPED | OUTPATIENT
Start: 2021-12-01 | End: 2021-12-07

## 2021-12-02 ENCOUNTER — TELEPHONE (OUTPATIENT)
Dept: FAMILY MEDICINE CLINIC | Facility: CLINIC | Age: 41
End: 2021-12-02

## 2021-12-13 ENCOUNTER — TELEPHONE (OUTPATIENT)
Dept: FAMILY MEDICINE CLINIC | Facility: CLINIC | Age: 41
End: 2021-12-13

## 2021-12-13 DIAGNOSIS — M72.2 PLANTAR FASCIITIS OF LEFT FOOT: Primary | ICD-10-CM

## 2021-12-13 RX ORDER — IBUPROFEN 800 MG/1
800 TABLET ORAL EVERY 8 HOURS PRN
Qty: 30 TABLET | Refills: 0 | Status: SHIPPED | OUTPATIENT
Start: 2021-12-13

## 2021-12-23 ENCOUNTER — HOSPITAL ENCOUNTER (OUTPATIENT)
Dept: RADIOLOGY | Facility: HOSPITAL | Age: 41
Discharge: HOME | End: 2021-12-23
Attending: PODIATRIST
Payer: COMMERCIAL

## 2021-12-23 DIAGNOSIS — M79.672 FOOT PAIN, LEFT: ICD-10-CM

## 2021-12-23 PROCEDURE — 73630 X-RAY EXAM OF FOOT: CPT

## 2021-12-29 ENCOUNTER — TELEPHONE (OUTPATIENT)
Dept: FAMILY MEDICINE CLINIC | Facility: CLINIC | Age: 41
End: 2021-12-29

## 2021-12-29 DIAGNOSIS — Z20.822 EXPOSURE TO CONFIRMED CASE OF COVID-19: Primary | ICD-10-CM

## 2021-12-29 PROCEDURE — U0005 INFEC AGEN DETEC AMPLI PROBE: HCPCS | Performed by: PHYSICIAN ASSISTANT

## 2021-12-29 PROCEDURE — U0003 INFECTIOUS AGENT DETECTION BY NUCLEIC ACID (DNA OR RNA); SEVERE ACUTE RESPIRATORY SYNDROME CORONAVIRUS 2 (SARS-COV-2) (CORONAVIRUS DISEASE [COVID-19]), AMPLIFIED PROBE TECHNIQUE, MAKING USE OF HIGH THROUGHPUT TECHNOLOGIES AS DESCRIBED BY CMS-2020-01-R: HCPCS | Performed by: PHYSICIAN ASSISTANT

## 2021-12-30 ENCOUNTER — TELEMEDICINE (OUTPATIENT)
Dept: FAMILY MEDICINE CLINIC | Facility: CLINIC | Age: 41
End: 2021-12-30
Payer: COMMERCIAL

## 2021-12-30 DIAGNOSIS — U07.1 COVID-19: Primary | ICD-10-CM

## 2021-12-30 DIAGNOSIS — R05.9 COUGH: ICD-10-CM

## 2021-12-30 PROCEDURE — 1036F TOBACCO NON-USER: CPT | Performed by: NURSE PRACTITIONER

## 2021-12-30 PROCEDURE — 99212 OFFICE O/P EST SF 10 MIN: CPT | Performed by: NURSE PRACTITIONER

## 2021-12-30 RX ORDER — BENZONATATE 200 MG/1
200 CAPSULE ORAL 3 TIMES DAILY PRN
Qty: 20 CAPSULE | Refills: 0 | Status: SHIPPED | OUTPATIENT
Start: 2021-12-30 | End: 2022-03-08

## 2022-01-04 ENCOUNTER — OFFICE VISIT (OUTPATIENT)
Dept: FAMILY MEDICINE CLINIC | Facility: CLINIC | Age: 42
End: 2022-01-04
Payer: COMMERCIAL

## 2022-01-04 VITALS
TEMPERATURE: 97.6 F | WEIGHT: 158.8 LBS | DIASTOLIC BLOOD PRESSURE: 80 MMHG | SYSTOLIC BLOOD PRESSURE: 110 MMHG | HEART RATE: 73 BPM | BODY MASS INDEX: 28.14 KG/M2 | HEIGHT: 63 IN | OXYGEN SATURATION: 98 % | RESPIRATION RATE: 20 BRPM

## 2022-01-04 DIAGNOSIS — U07.1 COVID-19: Primary | ICD-10-CM

## 2022-01-04 PROBLEM — Z12.31 ENCOUNTER FOR SCREENING MAMMOGRAM FOR MALIGNANT NEOPLASM OF BREAST: Status: RESOLVED | Noted: 2021-03-04 | Resolved: 2022-01-04

## 2022-01-04 PROBLEM — Z00.01 ENCOUNTER FOR WELL ADULT EXAM WITH ABNORMAL FINDINGS: Status: RESOLVED | Noted: 2018-10-30 | Resolved: 2022-01-04

## 2022-01-04 PROBLEM — R53.83 OTHER FATIGUE: Status: RESOLVED | Noted: 2021-03-04 | Resolved: 2022-01-04

## 2022-01-04 PROBLEM — Z11.4 ENCOUNTER FOR SCREENING FOR HIV: Status: RESOLVED | Noted: 2021-03-04 | Resolved: 2022-01-04

## 2022-01-04 PROBLEM — Z11.59 ENCOUNTER FOR HEPATITIS C SCREENING TEST FOR LOW RISK PATIENT: Status: RESOLVED | Noted: 2021-12-01 | Resolved: 2022-01-04

## 2022-01-04 PROBLEM — Z23 NEEDS FLU SHOT: Status: RESOLVED | Noted: 2021-12-01 | Resolved: 2022-01-04

## 2022-01-04 PROBLEM — Z00.00 ANNUAL PHYSICAL EXAM: Status: RESOLVED | Noted: 2021-03-04 | Resolved: 2022-01-04

## 2022-01-04 PROBLEM — R05.9 COUGH: Status: RESOLVED | Noted: 2021-12-30 | Resolved: 2022-01-04

## 2022-01-04 PROCEDURE — 3008F BODY MASS INDEX DOCD: CPT | Performed by: PHYSICIAN ASSISTANT

## 2022-01-04 PROCEDURE — 99213 OFFICE O/P EST LOW 20 MIN: CPT | Performed by: PHYSICIAN ASSISTANT

## 2022-01-04 PROCEDURE — 1036F TOBACCO NON-USER: CPT | Performed by: PHYSICIAN ASSISTANT

## 2022-01-04 NOTE — PROGRESS NOTES
COVID-19 Outpatient Progress Note    Assessment/Plan:    Problem List Items Addressed This Visit        Other    COVID-19 - Primary     Symptoms have resolved  Pt cleared to return to normal activities, but recommended to wear a mask when around others to minimize risk of infecting others  Disposition:     I have spent 15 minutes directly with the patient  Greater than 50% of this time was spent in counseling/coordination of care regarding: prognosis and patient and family education  Encounter provider Naeem Stanley PA-C      Recent Visits  Date Type Provider Dept   12/30/21 Telemedicine Lashell Cerrato, [address]   12/29/21 Telephone Judit Reddy, [address] [facility]   Showing recent visits within past 7 days and meeting all other requirements  Today's Visits  Date Type Provider Dept   01/04/22 Office Visit Naeem Stanley PA-C [facility]   Showing today's visits and meeting all other requirements  Future Appointments  No visits were found meeting these conditions  Showing future appointments within next 150 days and meeting all other requirements     Subjective:   Mervin Liu is a 37 y o  female who has been screened for COVID-19  Symptom change since last report: resolving  Patient is currently asymptomatic  Patient denies fever, chills, fatigue, malaise, congestion, rhinorrhea, sore throat, anosmia, loss of taste, cough, shortness of breath, chest tightness, abdominal pain, nausea, vomiting, diarrhea, myalgias and headaches  Date of symptom onset: 12/28/2021  Date of positive COVID-19 PCR: 12/29/2021  COVID-19 vaccination status: Fully vaccinated (primary series)    Lab Results   Component Value Date    SARSCOV2 Positive (A) 12/29/2021    SARSCOV2 Detected (A) 12/27/2020     History reviewed  No pertinent past medical history    Past Surgical History:   Procedure Laterality Date    BACK SURGERY      ESOPHAGOGASTRODUODENOSCOPY  04/2015    gastritis     Current Outpatient Medications   Medication Sig Dispense Refill    benzonatate (TESSALON) 200 MG capsule Take 1 capsule (200 mg total) by mouth 3 (three) times a day as needed for cough 20 capsule 0    ibuprofen (MOTRIN) 800 mg tablet Take 1 tablet (800 mg total) by mouth every 8 (eight) hours as needed for mild pain 30 tablet 0     No current facility-administered medications for this visit  Allergies   Allergen Reactions    No Known Allergies        Review of Systems   Constitutional: Negative for chills, fatigue and fever  HENT: Negative for congestion, rhinorrhea and sore throat  Respiratory: Negative for cough, chest tightness and shortness of breath  Gastrointestinal: Negative for abdominal pain, diarrhea, nausea and vomiting  Musculoskeletal: Negative for myalgias  Neurological: Negative for headaches  Objective:    Vitals:    01/04/22 1306   BP: 110/80   BP Location: Left arm   Patient Position: Sitting   Cuff Size: Standard   Pulse: 73   Resp: 20   Temp: 97 6 °F (36 4 °C)   TempSrc: Temporal   SpO2: 98%   Weight: 72 kg (158 lb 12 8 oz)   Height: 5' 3" (1 6 m)       Physical Exam  Vitals reviewed  Constitutional:       General: She is not in acute distress  Appearance: Normal appearance  She is not ill-appearing  Cardiovascular:      Rate and Rhythm: Normal rate and regular rhythm  Pulses: Normal pulses  Heart sounds: Normal heart sounds  No murmur heard  No friction rub  No gallop  Pulmonary:      Effort: Pulmonary effort is normal  No respiratory distress  Breath sounds: Normal breath sounds  No stridor  No wheezing, rhonchi or rales  Chest:      Chest wall: No tenderness  Musculoskeletal:      Cervical back: Normal range of motion  Skin:     General: Skin is warm and dry  Neurological:      Mental Status: She is alert and oriented to person, place, and time     Psychiatric:         Mood and Affect: Mood normal          Behavior: Behavior normal          Thought Content:  Thought content normal

## 2022-01-04 NOTE — ASSESSMENT & PLAN NOTE
Symptoms have resolved  Pt cleared to return to normal activities, but recommended to wear a mask when around others to minimize risk of infecting others

## 2022-03-08 ENCOUNTER — OFFICE VISIT (OUTPATIENT)
Dept: FAMILY MEDICINE CLINIC | Facility: CLINIC | Age: 42
End: 2022-03-08
Payer: COMMERCIAL

## 2022-03-08 VITALS
HEART RATE: 82 BPM | HEIGHT: 63 IN | OXYGEN SATURATION: 99 % | SYSTOLIC BLOOD PRESSURE: 110 MMHG | TEMPERATURE: 97.6 F | DIASTOLIC BLOOD PRESSURE: 76 MMHG | BODY MASS INDEX: 29.06 KG/M2 | RESPIRATION RATE: 18 BRPM | WEIGHT: 164 LBS

## 2022-03-08 DIAGNOSIS — R53.83 OTHER FATIGUE: ICD-10-CM

## 2022-03-08 DIAGNOSIS — Z12.31 ENCOUNTER FOR SCREENING MAMMOGRAM FOR MALIGNANT NEOPLASM OF BREAST: ICD-10-CM

## 2022-03-08 DIAGNOSIS — R73.9 HYPERGLYCEMIA: ICD-10-CM

## 2022-03-08 DIAGNOSIS — Z00.00 ANNUAL PHYSICAL EXAM: Primary | ICD-10-CM

## 2022-03-08 DIAGNOSIS — E78.2 MIXED HYPERLIPIDEMIA: ICD-10-CM

## 2022-03-08 PROCEDURE — 3008F BODY MASS INDEX DOCD: CPT | Performed by: NURSE PRACTITIONER

## 2022-03-08 PROCEDURE — 1036F TOBACCO NON-USER: CPT | Performed by: NURSE PRACTITIONER

## 2022-03-08 PROCEDURE — 99396 PREV VISIT EST AGE 40-64: CPT | Performed by: NURSE PRACTITIONER

## 2022-03-08 PROCEDURE — 3725F SCREEN DEPRESSION PERFORMED: CPT | Performed by: NURSE PRACTITIONER

## 2022-03-08 NOTE — PATIENT INSTRUCTIONS

## 2022-03-08 NOTE — PROGRESS NOTES
[address] PRIMARY CARE [facility]    NAME: Edwar Helms  AGE: 40 y o  SEX: female  : 1980     DATE: 3/8/2022     Assessment and Plan:     Problem List Items Addressed This Visit        Other    Mixed hyperlipidemia    Relevant Orders    Lipid panel    Hyperglycemia    Relevant Orders    Comprehensive metabolic panel    Annual physical exam - Primary     -Patient recommended healthy eating habits  Health risk assessment was discussed with patient also and the ways to stay healthier  Recommended a exercising frequently at least 5 days a week for 30 minutes at a time; such as joining a gym if not already enrolled or walking  Eating low-fat and low-cholesterol foods with avoidance of saturated fats or fried foods  Immunizations, and the need to comply with current CDC's recommendations were discussed  To follow up yearly for physical exams  Encounter for screening mammogram for malignant neoplasm of breast    Relevant Orders    Mammo screening bilateral w 3d & cad    Other fatigue    Relevant Orders    CBC and differential    TSH, 3rd generation with Free T4 reflex          Immunizations and preventive care screenings were discussed with patient today  Appropriate education was printed on patient's after visit summary  Counseling:  Alcohol/drug use: discussed moderation in alcohol intake, the recommendations for healthy alcohol use, and avoidance of illicit drug use  Dental Health: discussed importance of regular tooth brushing, flossing, and dental visits  Injury prevention: discussed safety/seat belts, safety helmets, smoke detectors, carbon dioxide detectors, and smoking near bedding or upholstery  Sexual health: discussed sexually transmitted diseases, partner selection, use of condoms, avoidance of unintended pregnancy, and contraceptive alternatives    · Exercise: the importance of regular exercise/physical activity was discussed  Recommend exercise 3-5 times per week for at least 30 minutes  BMI Counseling: Body mass index is 29 05 kg/m²  The BMI is above normal  Nutrition recommendations include encouraging healthy choices of fruits and vegetables, decreasing fast food intake, consuming healthier snacks, limiting drinks that contain sugar, increasing intake of lean protein, reducing intake of saturated and trans fat and reducing intake of cholesterol  Exercise recommendations include exercising 3-5 times per week  Rationale for BMI follow-up plan is due to patient being overweight or obese  Depression Screening and Follow-up Plan: Patient was screened for depression during today's encounter  They screened negative with a PHQ-2 score of 0  No follow-ups on file  Chief Complaint:     Chief Complaint   Patient presents with    Physical Exam      History of Present Illness:     Adult Annual Physical   Patient here for a comprehensive physical exam  The patient reports no problems  Diet and Physical Activity  · Diet/Nutrition: well balanced diet and consuming 3-5 servings of fruits/vegetables daily  · Exercise: no formal exercise  Depression Screening  PHQ-2/9 Depression Screening    Little interest or pleasure in doing things: 0 - not at all  Feeling down, depressed, or hopeless: 0 - not at all  PHQ-2 Score: 0  PHQ-2 Interpretation: Negative depression screen       General Health  · Sleep: gets 4-6 hours of sleep on average and gets 7-8 hours of sleep on average  · Hearing: normal - bilateral   · Vision: goes for regular eye exams  · Dental: regular dental visits and brushes teeth twice daily  /GYN Health  · Patient is: perimenopausal  · Last menstrual period: 03/4/22  · Contraceptive method: tubal ligation  Review of Systems:     Review of Systems   Constitutional: Negative  Negative for appetite change and fever  HENT: Negative  Eyes: Negative  Respiratory: Negative  Negative for cough, chest tightness, shortness of breath and wheezing  Cardiovascular: Negative  Negative for chest pain and palpitations  Gastrointestinal: Negative  Endocrine: Negative  Genitourinary: Negative  Musculoskeletal: Negative  Negative for arthralgias  Skin: Negative  Allergic/Immunologic: Negative  Neurological: Negative  Negative for dizziness and headaches  Hematological: Negative  Psychiatric/Behavioral: Negative  Past Medical History:     History reviewed  No pertinent past medical history     Past Surgical History:     Past Surgical History:   Procedure Laterality Date    BACK SURGERY      ESOPHAGOGASTRODUODENOSCOPY  04/2015    gastritis      Social History:     Social History     Socioeconomic History    Marital status: Single     Spouse name: None    Number of children: None    Years of education: None    Highest education level: None   Occupational History    None   Tobacco Use    Smoking status: Never Smoker    Smokeless tobacco: Never Used   Vaping Use    Vaping Use: Never used   Substance and Sexual Activity    Alcohol use: No    Drug use: No    Sexual activity: Yes     Partners: Male     Birth control/protection: None   Other Topics Concern    None   Social History Narrative    None     Social Determinants of Health     Financial Resource Strain: Not on file   Food Insecurity: Not on file   Transportation Needs: Not on file   Physical Activity: Not on file   Stress: Not on file   Social Connections: Not on file   Intimate Partner Violence: Not on file   Housing Stability: Not on file      Family History:     Family History   Problem Relation Age of Onset    Diabetes Maternal Grandmother     No Known Problems Mother     No Known Problems Father     No Known Problems Sister     No Known Problems Maternal Grandfather     No Known Problems Paternal Grandmother     No Known Problems Paternal Grandfather     No Known Problems Sister    Jesse Hackett No Known Problems Sister     No Known Problems Son     No Known Problems Son     No Known Problems Maternal Aunt     No Known Problems Maternal Aunt     No Known Problems Maternal Aunt       Current Medications:     Current Outpatient Medications   Medication Sig Dispense Refill    ibuprofen (MOTRIN) 800 mg tablet Take 1 tablet (800 mg total) by mouth every 8 (eight) hours as needed for mild pain 30 tablet 0     No current facility-administered medications for this visit  Allergies: Allergies   Allergen Reactions    No Known Allergies       Physical Exam:     /76 (BP Location: Left arm, Patient Position: Sitting, Cuff Size: Standard)   Pulse 82   Temp 97 6 °F (36 4 °C) (Temporal)   Resp 18   Ht 5' 3" (1 6 m)   Wt 74 4 kg (164 lb)   LMP 03/04/2022   SpO2 99%   BMI 29 05 kg/m²     Physical Exam  Vitals and nursing note reviewed  Constitutional:       General: She is not in acute distress  Appearance: Normal appearance  She is not ill-appearing  HENT:      Head: Normocephalic and atraumatic  Right Ear: Tympanic membrane, ear canal and external ear normal  There is no impacted cerumen  Left Ear: Tympanic membrane, ear canal and external ear normal  There is no impacted cerumen  Nose: Nose normal  No congestion or rhinorrhea  Mouth/Throat:      Mouth: Mucous membranes are moist       Pharynx: Oropharynx is clear  No oropharyngeal exudate or posterior oropharyngeal erythema  Eyes:      Extraocular Movements: Extraocular movements intact  Conjunctiva/sclera: Conjunctivae normal       Pupils: Pupils are equal, round, and reactive to light  Cardiovascular:      Rate and Rhythm: Normal rate and regular rhythm  Pulses: Normal pulses  Heart sounds: Normal heart sounds  Pulmonary:      Effort: Pulmonary effort is normal       Breath sounds: Normal breath sounds  Abdominal:      General: Bowel sounds are normal       Palpations: Abdomen is soft  Musculoskeletal:         General: No tenderness or deformity  Normal range of motion  Cervical back: Normal range of motion and neck supple  Skin:     General: Skin is warm and dry  Capillary Refill: Capillary refill takes less than 2 seconds  Neurological:      General: No focal deficit present  Mental Status: She is alert and oriented to person, place, and time     Psychiatric:         Mood and Affect: Mood normal          Behavior: Behavior normal           Ida Bynum, [id number]

## 2022-04-15 ENCOUNTER — APPOINTMENT (OUTPATIENT)
Dept: LAB | Facility: HOSPITAL | Age: 42
End: 2022-04-15
Payer: COMMERCIAL

## 2022-04-15 DIAGNOSIS — R73.9 HYPERGLYCEMIA: ICD-10-CM

## 2022-04-15 DIAGNOSIS — R53.83 OTHER FATIGUE: ICD-10-CM

## 2022-04-15 DIAGNOSIS — Z11.59 ENCOUNTER FOR HEPATITIS C SCREENING TEST FOR LOW RISK PATIENT: ICD-10-CM

## 2022-04-15 DIAGNOSIS — E78.2 MIXED HYPERLIPIDEMIA: ICD-10-CM

## 2022-04-15 LAB
ALBUMIN SERPL BCP-MCNC: 4.5 G/DL (ref 3–5.2)
ALP SERPL-CCNC: 62 U/L (ref 43–122)
ALT SERPL W P-5'-P-CCNC: 17 U/L
ANION GAP SERPL CALCULATED.3IONS-SCNC: 7 MMOL/L (ref 5–14)
AST SERPL W P-5'-P-CCNC: 26 U/L (ref 14–36)
BASOPHILS # BLD AUTO: 0.04 THOUSANDS/ΜL (ref 0–0.1)
BASOPHILS NFR BLD AUTO: 1 % (ref 0–1)
BILIRUB SERPL-MCNC: 0.66 MG/DL
BUN SERPL-MCNC: 12 MG/DL (ref 5–25)
CALCIUM SERPL-MCNC: 9 MG/DL (ref 8.4–10.2)
CHLORIDE SERPL-SCNC: 104 MMOL/L (ref 97–108)
CHOLEST SERPL-MCNC: 194 MG/DL
CO2 SERPL-SCNC: 28 MMOL/L (ref 22–30)
CREAT SERPL-MCNC: 0.69 MG/DL (ref 0.6–1.2)
EOSINOPHIL # BLD AUTO: 0.09 THOUSAND/ΜL (ref 0–0.61)
EOSINOPHIL NFR BLD AUTO: 2 % (ref 0–6)
ERYTHROCYTE [DISTWIDTH] IN BLOOD BY AUTOMATED COUNT: 12.7 % (ref 11.6–15.1)
GFR SERPL CREATININE-BSD FRML MDRD: 108 ML/MIN/1.73SQ M
GLUCOSE P FAST SERPL-MCNC: 92 MG/DL (ref 70–99)
HCT VFR BLD AUTO: 39.4 % (ref 34.8–46.1)
HCV AB SER QL: NORMAL
HDLC SERPL-MCNC: 61 MG/DL
HGB BLD-MCNC: 12.9 G/DL (ref 11.5–15.4)
IMM GRANULOCYTES # BLD AUTO: 0.02 THOUSAND/UL (ref 0–0.2)
IMM GRANULOCYTES NFR BLD AUTO: 0 % (ref 0–2)
LDLC SERPL CALC-MCNC: 121 MG/DL
LYMPHOCYTES # BLD AUTO: 2.23 THOUSANDS/ΜL (ref 0.6–4.47)
LYMPHOCYTES NFR BLD AUTO: 39 % (ref 14–44)
MCH RBC QN AUTO: 29.4 PG (ref 26.8–34.3)
MCHC RBC AUTO-ENTMCNC: 32.7 G/DL (ref 31.4–37.4)
MCV RBC AUTO: 90 FL (ref 82–98)
MONOCYTES # BLD AUTO: 0.54 THOUSAND/ΜL (ref 0.17–1.22)
MONOCYTES NFR BLD AUTO: 10 % (ref 4–12)
NEUTROPHILS # BLD AUTO: 2.77 THOUSANDS/ΜL (ref 1.85–7.62)
NEUTS SEG NFR BLD AUTO: 48 % (ref 43–75)
NONHDLC SERPL-MCNC: 133 MG/DL
NRBC BLD AUTO-RTO: 0 /100 WBCS
PLATELET # BLD AUTO: 299 THOUSANDS/UL (ref 149–390)
PMV BLD AUTO: 10.1 FL (ref 8.9–12.7)
POTASSIUM SERPL-SCNC: 3.9 MMOL/L (ref 3.6–5)
PROT SERPL-MCNC: 8.2 G/DL (ref 5.9–8.4)
RBC # BLD AUTO: 4.39 MILLION/UL (ref 3.81–5.12)
SODIUM SERPL-SCNC: 139 MMOL/L (ref 137–147)
TRIGL SERPL-MCNC: 60 MG/DL
TSH SERPL DL<=0.05 MIU/L-ACNC: 3.14 UIU/ML (ref 0.45–4.5)
WBC # BLD AUTO: 5.69 THOUSAND/UL (ref 4.31–10.16)

## 2022-04-15 PROCEDURE — 36415 COLL VENOUS BLD VENIPUNCTURE: CPT

## 2022-04-15 PROCEDURE — 80061 LIPID PANEL: CPT

## 2022-04-15 PROCEDURE — 80053 COMPREHEN METABOLIC PANEL: CPT

## 2022-04-15 PROCEDURE — 85025 COMPLETE CBC W/AUTO DIFF WBC: CPT

## 2022-04-15 PROCEDURE — 84443 ASSAY THYROID STIM HORMONE: CPT

## 2022-04-15 PROCEDURE — 86803 HEPATITIS C AB TEST: CPT

## 2022-11-09 ENCOUNTER — OFFICE VISIT (OUTPATIENT)
Dept: OBGYN CLINIC | Facility: CLINIC | Age: 42
End: 2022-11-09

## 2022-11-09 VITALS
BODY MASS INDEX: 29.06 KG/M2 | HEIGHT: 63 IN | SYSTOLIC BLOOD PRESSURE: 121 MMHG | WEIGHT: 164 LBS | HEART RATE: 73 BPM | DIASTOLIC BLOOD PRESSURE: 83 MMHG

## 2022-11-09 DIAGNOSIS — M54.16 LEFT LUMBAR RADICULOPATHY: ICD-10-CM

## 2022-11-09 DIAGNOSIS — G89.29 CHRONIC LEFT-SIDED LOW BACK PAIN WITHOUT SCIATICA: Primary | ICD-10-CM

## 2022-11-09 DIAGNOSIS — M54.50 CHRONIC LEFT-SIDED LOW BACK PAIN WITHOUT SCIATICA: Primary | ICD-10-CM

## 2022-11-09 DIAGNOSIS — M25.552 PAIN IN LEFT HIP: ICD-10-CM

## 2022-11-09 RX ORDER — NAPROXEN 500 MG/1
500 TABLET ORAL 2 TIMES DAILY WITH MEALS
Qty: 60 TABLET | Refills: 0 | Status: SHIPPED | OUTPATIENT
Start: 2022-11-09

## 2022-11-09 RX ORDER — PREDNISONE 10 MG/1
TABLET ORAL
Qty: 42 TABLET | Refills: 0 | Status: SHIPPED | OUTPATIENT
Start: 2022-11-09
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
## 2022-11-18 ENCOUNTER — HOSPITAL ENCOUNTER (OUTPATIENT)
Dept: MAMMOGRAPHY | Facility: CLINIC | Age: 42
End: 2022-11-18

## 2022-11-18 VITALS — WEIGHT: 164 LBS | BODY MASS INDEX: 29.06 KG/M2 | HEIGHT: 63 IN

## 2022-11-18 DIAGNOSIS — Z12.31 ENCOUNTER FOR SCREENING MAMMOGRAM FOR MALIGNANT NEOPLASM OF BREAST: ICD-10-CM

## 2022-11-27 NOTE — RESULT ENCOUNTER NOTE
Dear Bailee Arroyo: mammogram was reported with no  mammographic evidence of cancer  Estimada Carol: la mamografía fue reportada sin  evidencia mamográfica de cáncer

## 2022-11-28 ENCOUNTER — EVALUATION (OUTPATIENT)
Dept: PHYSICAL THERAPY | Facility: OTHER | Age: 42
End: 2022-11-28

## 2022-11-28 DIAGNOSIS — M54.50 CHRONIC LEFT-SIDED LOW BACK PAIN WITHOUT SCIATICA: ICD-10-CM

## 2022-11-28 DIAGNOSIS — G89.29 CHRONIC LEFT-SIDED LOW BACK PAIN WITHOUT SCIATICA: ICD-10-CM

## 2022-11-28 DIAGNOSIS — M54.16 LEFT LUMBAR RADICULOPATHY: Primary | ICD-10-CM
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
## 2022-12-05 ENCOUNTER — OFFICE VISIT (OUTPATIENT)
Dept: PHYSICAL THERAPY | Facility: OTHER | Age: 42
End: 2022-12-05

## 2022-12-05 DIAGNOSIS — M54.50 CHRONIC LEFT-SIDED LOW BACK PAIN WITHOUT SCIATICA: ICD-10-CM

## 2022-12-05 DIAGNOSIS — M54.16 LEFT LUMBAR RADICULOPATHY: Primary | ICD-10-CM

## 2022-12-05 DIAGNOSIS — G89.29 CHRONIC LEFT-SIDED LOW BACK PAIN WITHOUT SCIATICA: ICD-10-CM

## 2022-12-05 NOTE — PROGRESS NOTES
Daily Note     Today's date: 2022  Patient name: Maureen Chadwick  : 1980  MRN: [id number]  Referring provider: Anali Berman*  Dx:   Encounter Diagnosis     ICD-10-CM    1  Left lumbar radiculopathy  M54 16       2  Chronic left-sided low back pain without sciatica  M54 50     G89 29                      Subjective: "My back feels maybe a little bit better "    Total treatment time 3855-4120, only billed 1 unit due to insurance  Objective: See treatment diary below      Assessment: Pt continues to have pain with P-A mobilizations, especially around T9-T10  Alleviation with cupping  Pt poor historian due to language barrier, and could not report her pain after last session  Extensions performed with alleviation in R sided back pain but unable to report changes in L LE  trialed prone press ups today, were able to tolerate  Continue with extensions as tolerated  Add side glide if no change  Added LTR and hip mobilizations with belt for L groin pain  Slight alleviation in hip symptoms with belt mobilizations  Also trialed bridges, difficulty due to weakness  Will continue with extension-based exercises and will add in more core stabilization nv  Plan: Continue per plan of care        Precautions: N/A  **1 unit billing for insurance  [id number]       Manuals            Thoracic and lumbar P-A grIII-IV KA KA           Cupping - thoracic paraspinal KA KA           Hip mobilizations with belt - lateral with flexion, ER, IR nv KA           Hip distraction nv nv           Hamstring stretch nv nv           Possible lateral shift nv            Neuro Re-Ed             Standing lumbar extensions 10x 2x10           Slump sliders 10x 20x B           SLR  nv            Clamshells  nv            Prone press ups nv 2x10           LTR  10"x5 B           Bridges   10"x10           SLS             Ther Ex             bike nv 8'           Hamstring stretch nv            Standing hip 4 way nv            Leg press nv                                                                Ther Activity                                       Gait Training                                       Modalities

## 2022-12-09 ENCOUNTER — OFFICE VISIT (OUTPATIENT)
Dept: PHYSICAL THERAPY | Facility: OTHER | Age: 42
End: 2022-12-09

## 2022-12-09 DIAGNOSIS — M54.50 CHRONIC LEFT-SIDED LOW BACK PAIN WITHOUT SCIATICA: ICD-10-CM

## 2022-12-09 DIAGNOSIS — G89.29 CHRONIC LEFT-SIDED LOW BACK PAIN WITHOUT SCIATICA: ICD-10-CM

## 2022-12-09 DIAGNOSIS — M54.16 LEFT LUMBAR RADICULOPATHY: Primary | ICD-10-CM

## 2022-12-09 NOTE — PROGRESS NOTES
Daily Note     Today's date: 2022  Patient name: George Pickens  : 1980  MRN: [id number]  Referring provider: Michele Teresa*  Dx:   Encounter Diagnosis     ICD-10-CM    1  Left lumbar radiculopathy  M54 16       2  Chronic left-sided low back pain without sciatica  M54 50     G89 29                    Total treatment time 6136-5649, 1-on- 3512-0264, billed [phone] per insurance  Subjective: "My back is getting better but it still hurts "      Objective: See treatment diary below      Assessment: Session focused on pain modalities for hip and low back, and core and abd strengthening  Cupping continues to alleviate pain back, perform with AROM nv  Belt mobilizations also improved hip pain, followed by abd press  Pt required therapeutic rest breaks due to decreased tolerance to exercise  Will continue with extension-biased exercises for the back, core stabilization, and hip strengthening as tolerated  Plan: Continue per plan of care        Precautions: N/A  **1 unit billing for insurance  [id number]       Manuals           Thoracic and lumbar P-A grIII-IV KA KA           Cupping - thoracic paraspinal KA KA KA    Static 2'          Hip mobilizations with belt - lateral with flexion, ER, IR nv KA KA          Hip distraction nv nv KA          Hamstring stretch nv nv           Possible lateral shift nv            Neuro Re-Ed             Standing lumbar extensions 10x 2x10 2x10          Slump sliders 10x 20x B           SLR  nv            Clamshells  nv            Prone press ups nv 2x10 2x10          LTR  10"x5 B           Bridges   10"x10 10"x10          SLS             abd press into wall   10"x10          pallof press    nv          Ther Ex             bike nv 8' 10'          Hamstring stretch nv            Standing hip 4 way nv            Leg press nv                                                                Ther Activity                                       Gait Training Modalities

## 2022-12-12 ENCOUNTER — OFFICE VISIT (OUTPATIENT)
Dept: PHYSICAL THERAPY | Facility: OTHER | Age: 42
End: 2022-12-12

## 2022-12-12 DIAGNOSIS — M54.16 LEFT LUMBAR RADICULOPATHY: Primary | ICD-10-CM

## 2022-12-12 DIAGNOSIS — G89.29 CHRONIC LEFT-SIDED LOW BACK PAIN WITHOUT SCIATICA: ICD-10-CM

## 2022-12-12 DIAGNOSIS — M54.50 CHRONIC LEFT-SIDED LOW BACK PAIN WITHOUT SCIATICA: ICD-10-CM

## 2022-12-12 NOTE — PROGRESS NOTES
Daily Note     Today's date: 2022  Patient name: Jasiel Sharp  : 1980  MRN: [id number]  Referring provider: Victorino Degroot*  Dx:   Encounter Diagnosis     ICD-10-CM    1  Left lumbar radiculopathy  M54 16       2  Chronic left-sided low back pain without sciatica  M54 50     G89 29                    Total treatment time 2367-1234, billed [phone] per insurance  Subjective: "My back hurts today but I felt pretty good after last session "      Objective: See treatment diary below      Assessment: Pt's pain not alleviated with cupping today, continues to have L sided thoracic and lumbar pain  Added LTR again today, which seemed to help  Trialed leg press today, pt stated that flexion did not feel good  Will continue with extension based exercises and core and hip stabilization as tolerated  Plan: Continue per plan of care        Precautions: N/A  **1 unit billing for insurance  [id number]       Manuals          Thoracic and lumbar P-A grIII-IV KA KA           Cupping - thoracic paraspinal KA KA KA    Static 2' KA    Static 3'    PBall rollout         Hip mobilizations with belt - lateral with flexion, ER, IR nv KA KA          Hip distraction nv nv KA          Hamstring stretch nv nv           Possible lateral shift nv            Neuro Re-Ed             Standing lumbar extensions 10x 2x10 2x10          Slump sliders 10x 20x B           SLR  nv            Clamshells  nv            Prone press ups nv 2x10 2x10          LTR  10"x5 B  To the R 10"x10         Bridges   10"x10 10"x10          SLS             abd press into wall   10"x10 nv         pallof press    nv nv         PBall roll out    10"x5 3-way with cupping         Ther Ex             bike nv 8' 10' 10'         Hamstring stretch nv            Standing hip 4 way nv            Leg press nv   70# 10x                                                             Ther Activity                                       Gait Training                                       Modalities

## 2022-12-15 ENCOUNTER — OFFICE VISIT (OUTPATIENT)
Dept: PHYSICAL THERAPY | Facility: OTHER | Age: 42
End: 2022-12-15

## 2022-12-15 DIAGNOSIS — M54.50 CHRONIC LEFT-SIDED LOW BACK PAIN WITHOUT SCIATICA: ICD-10-CM

## 2022-12-15 DIAGNOSIS — G89.29 CHRONIC LEFT-SIDED LOW BACK PAIN WITHOUT SCIATICA: ICD-10-CM

## 2022-12-15 DIAGNOSIS — M54.16 LEFT LUMBAR RADICULOPATHY: Primary | ICD-10-CM

## 2022-12-15 NOTE — PROGRESS NOTES
Daily Note     Today's date: 12/15/2022  Patient name: Victor Hugo Zuniga  : 1980  MRN: [id number]  Referring provider: Radha Riley*  Dx:   Encounter Diagnosis     ICD-10-CM    1  Left lumbar radiculopathy  M54 16       2  Chronic left-sided low back pain without sciatica  M54 50     G89 29                    Total treatment time 3403-7138, billed 1 unit for insurance [id number]  Subjective: "My back is worse  It is not getting better "      Objective: See treatment diary below      Assessment: Trialed flexion-based exercises today instead of extension, as well as side glides with no relief  Pt stated that she was performing both extension and flexion, and flexion was more relieving  She describes her pain as a "tight muscle" and continues to have some relief with cupping to paraspinals  Will assess progress nv with performing just flexion-based exercises at home, if no relief, continue to progress core stabilization and LE strengthening  Plan: Continue per plan of care        Precautions: N/A  **1 unit billing for insurance  [id number]       Manuals 11/28 12/5 12/9 12/12 12/15        Thoracic and lumbar P-A grIII-IV KA KA           Cupping - thoracic paraspinal KA KA KA    Static 2' KA    Static 3'    PBall rollout KA static 3'        Hip mobilizations with belt - lateral with flexion, ER, IR nv KA KA          Hip distraction nv nv KA          Hamstring stretch nv nv           Possible lateral shift nv    R and L x10    L with extension 10x         Neuro Re-Ed             Standing lumbar extensions 10x 2x10 2x10          Standing lumbar flexion     10x         Slump sliders 10x 20x B           SLR  nv            Clamshells  nv            Prone press ups nv 2x10 2x10          LTR  10"x5 B  To the R 10"x10         Bridges   10"x10 10"x10          SLS             abd press into wall   10"x10 nv         pallof press    nv nv GTB 5"x10 B        PBall roll out    10"x5 3-way with cupping         Lateral glide on wall     10x R and L ea        Ther Ex             bike nv 8' 10' 10' 10'        Hamstring stretch nv            Standing hip 4 way nv            Leg press nv   70# 10x                                                             Ther Activity                                       Gait Training                                       Modalities

## 2022-12-19 ENCOUNTER — OFFICE VISIT (OUTPATIENT)
Dept: PHYSICAL THERAPY | Facility: OTHER | Age: 42
End: 2022-12-19

## 2022-12-19 DIAGNOSIS — G89.29 CHRONIC LEFT-SIDED LOW BACK PAIN WITHOUT SCIATICA: ICD-10-CM

## 2022-12-19 DIAGNOSIS — M54.16 LEFT LUMBAR RADICULOPATHY: Primary | ICD-10-CM

## 2022-12-19 DIAGNOSIS — M54.50 CHRONIC LEFT-SIDED LOW BACK PAIN WITHOUT SCIATICA: ICD-10-CM

## 2022-12-19 NOTE — PROGRESS NOTES
Daily Note     Today's date: 2022  Patient name: Shasta Bennett  : 1980  MRN: [id number]  Referring provider: Darcie Naranjo*  Dx:   Encounter Diagnosis     ICD-10-CM    1  Left lumbar radiculopathy  M54 16       2  Chronic left-sided low back pain without sciatica  M54 50     G89 29                    Total treatment time 0518-4916, 1 unit billed due to insurance [id number]  Subjective: "My back feels about the same  Sometimes it is a little bit better "      Objective: See treatment diary below      Assessment: Continues to tolerate treatment, but has symptoms during session  Cupping provides minimal relief now  Session focused on core and hip stabilization, continues to report some benefit  Instructed to perform lumbar flexion only if they are relieving, and will continue with LE strengthening and balance in future sessions  Plan: Continue per plan of care        Precautions: N/A  **1 unit billing for insurance  [id number]       Manuals 11/28 12/5 12/9 12/12 12/15 12/19       Thoracic and lumbar P-A grIII-IV KA KA           Cupping - thoracic paraspinal KA KA KA    Static 2' KA    Static 3'    PBall rollout KA static 3' KA static 3'       Hip mobilizations with belt - lateral with flexion, ER, IR nv KA KA          Hip distraction nv nv KA          Hamstring stretch nv nv           Possible lateral shift nv    R and L x10    L with extension 10x         Neuro Re-Ed             Standing lumbar extensions 10x 2x10 2x10          Standing lumbar flexion     10x         Slump sliders 10x 20x B           SLR  nv     nv       Clamshells  nv     nv       Prone press ups nv 2x10 2x10          LTR  10"x5 B  To the R 10"x10         Bridges   10"x10 10"x10   10"x10       SLS      nv       abd press into wall   10"x10 nv  10"x10       pallof press    nv nv GTB 5"x10 B GTB 5"x15 B       PBall roll out    10"x5 3-way with cupping         Lateral glide on wall     10x R and L ea        Ther Ex bike nv 8' 10' 10' 10' 10'       Hamstring stretch nv            Standing hip 4 way nv     nv       Leg press nv   70# 10x         LAQ      nv                                              Ther Activity                                       Gait Training                                       Modalities

## 2022-12-22 ENCOUNTER — APPOINTMENT (OUTPATIENT)
Dept: PHYSICAL THERAPY | Facility: OTHER | Age: 42
End: 2022-12-22

## 2022-12-29 ENCOUNTER — APPOINTMENT (OUTPATIENT)
Dept: PHYSICAL THERAPY | Facility: OTHER | Age: 42
End: 2022-12-29

## 2022-12-30 ENCOUNTER — OFFICE VISIT (OUTPATIENT)
Dept: OBGYN CLINIC | Facility: CLINIC | Age: 42
End: 2022-12-30

## 2022-12-30 VITALS
SYSTOLIC BLOOD PRESSURE: 124 MMHG | WEIGHT: 163.2 LBS | HEART RATE: 76 BPM | BODY MASS INDEX: 28.92 KG/M2 | DIASTOLIC BLOOD PRESSURE: 83 MMHG | HEIGHT: 63 IN

## 2022-12-30 DIAGNOSIS — M54.16 LEFT LUMBAR RADICULOPATHY: ICD-10-CM

## 2022-12-30 DIAGNOSIS — M54.50 CHRONIC LEFT-SIDED LOW BACK PAIN WITHOUT SCIATICA: Primary | ICD-10-CM

## 2022-12-30 DIAGNOSIS — G89.29 CHRONIC LEFT-SIDED LOW BACK PAIN WITHOUT SCIATICA: Primary | ICD-10-CM

## 2022-12-30 DIAGNOSIS — Z98.1 HISTORY OF THORACIC SPINAL FUSION: ICD-10-CM

## 2022-12-30 DIAGNOSIS — M25.552 PAIN IN LEFT HIP: ICD-10-CM

## 2022-12-30 RX ORDER — MELOXICAM 15 MG/1
15 TABLET ORAL DAILY
Qty: 30 TABLET | Refills: 0 | Status: SHIPPED | OUTPATIENT
Start: 2022-12-30
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
## 2023-01-06 ENCOUNTER — OFFICE VISIT (OUTPATIENT)
Dept: PHYSICAL THERAPY | Facility: OTHER | Age: 43
End: 2023-01-06

## 2023-01-06 DIAGNOSIS — M54.50 CHRONIC LEFT-SIDED LOW BACK PAIN WITHOUT SCIATICA: ICD-10-CM

## 2023-01-06 DIAGNOSIS — G89.29 CHRONIC LEFT-SIDED LOW BACK PAIN WITHOUT SCIATICA: ICD-10-CM

## 2023-01-06 DIAGNOSIS — M54.16 LEFT LUMBAR RADICULOPATHY: Primary | ICD-10-CM

## 2023-01-06 NOTE — PROGRESS NOTES
Daily Note     Today's date: 2023  Patient name: Miracle Adrian  : 1980  MRN: [id number]  Referring provider: Ricardo Bran*  Dx:   Encounter Diagnosis     ICD-10-CM    1  Left lumbar radiculopathy  M54 16       2  Chronic left-sided low back pain without sciatica  M54 50     G89 29                    Total treatment time 5295-5098, 1-on- 5789-4903 (1 unit billed for insurance)  Subjective: "My back is still the same  I saw the doctor and I am getting an MRI next week and I think they want to give me more medicine "      Objective: See treatment diary below      Assessment: Session continued to focus on LE strengthening today and cupping for pain relief  Pt has not shown any changes, possible re-eval nv in order to determine further POC  Plan: Continue per plan of care        Precautions: N/A  **1 unit billing for insurance  [id number]       Manuals 11/28 12/5 12/9 12/12 12/15 12/19 1/6      Thoracic and lumbar P-A grIII-IV KA KA           Cupping - thoracic paraspinal KA KA KA    Static 2' KA    Static 3'    PBall rollout KA static 3' KA static 3' KA static 5'      Hip mobilizations with belt - lateral with flexion, ER, IR nv KA KA          Hip distraction nv nv KA          Hamstring stretch nv nv           Possible lateral shift nv    R and L x10    L with extension 10x         Neuro Re-Ed             Standing lumbar extensions 10x 2x10 2x10          Standing lumbar flexion     10x         Slump sliders 10x 20x B           SLR  nv     nv       Clamshells  nv     nv       Prone press ups nv 2x10 2x10          LTR  10"x5 B  To the R 10"x10         Bridges   10"x10 10"x10   10"x10       SLS      nv       abd press into wall   10"x10 nv  10"x10       pallof press    nv nv GTB 5"x10 B GTB 5"x15 B       PBall roll out    10"x5 3-way with cupping         Lateral glide on wall     10x R and L ea        Ther Ex             bike nv 8' 10' 10' 10' 10' 10'      Hamstring stretch nv            Standing hip 4 way nv     nv BTB 20x ea      Leg press nv   70# 10x         LAQ      nv                                              Ther Activity                                       Gait Training                                       Modalities

## 2023-01-09 ENCOUNTER — OFFICE VISIT (OUTPATIENT)
Dept: PHYSICAL THERAPY | Facility: OTHER | Age: 43
End: 2023-01-09

## 2023-01-09 DIAGNOSIS — G89.29 CHRONIC LEFT-SIDED LOW BACK PAIN WITHOUT SCIATICA: ICD-10-CM

## 2023-01-09 DIAGNOSIS — M54.50 CHRONIC LEFT-SIDED LOW BACK PAIN WITHOUT SCIATICA: ICD-10-CM

## 2023-01-09 DIAGNOSIS — M54.16 LEFT LUMBAR RADICULOPATHY: Primary | ICD-10-CM

## 2023-01-09 NOTE — PROGRESS NOTES
Daily Note     Today's date: 2023  Patient name: Hawk Mcdaniels  : 1980  MRN: [id number]  Referring provider: Syeda Cee*  Dx:   Encounter Diagnosis     ICD-10-CM    1  Left lumbar radiculopathy  M54 16       2  Chronic left-sided low back pain without sciatica  M54 50     G89 29                    Total treatment time 5718-0902, 1 unit billed for insurance [id number]  Subjective: "My pain is the same "      Objective: See treatment diary below      Assessment: Session continued to focus on cupping for symptom relief and LE strengthening, will continue to progress in the future  Pt continues to report little to no change in pain since beginning PT  Pt to get CT scan tomorrow and follow up with Dr Paul Calderón next week  Continue with LE strengthening and balance in future visits  Plan: Continue per plan of care        Precautions: N/A  **1 unit billing for insurance  [id number]       Manuals 11/28 12/5 12/9 12/12 12/15 12/19 1/6 1/9     Thoracic and lumbar P-A grIII-IV KA KA           Cupping - thoracic paraspinal KA KA KA    Static 2' KA    Static 3'    PBall rollout KA static 3' KA static 3' KA static 5' KA static 5'     Hip mobilizations with belt - lateral with flexion, ER, IR nv KA KA          Hip distraction nv nv KA          Hamstring stretch nv nv           Possible lateral shift nv    R and L x10    L with extension 10x         Neuro Re-Ed             Standing lumbar extensions 10x 2x10 2x10          Standing lumbar flexion     10x         Slump sliders 10x 20x B           SLR  nv     nv       Clamshells  nv     nv       Prone press ups nv 2x10 2x10          LTR  10"x5 B  To the R 10"x10         Bridges   10"x10 10"x10   10"x10       SLS      nv       abd press into wall   10"x10 nv  10"x10       pallof press    nv nv GTB 5"x10 B GTB 5"x15 B       PBall roll out    10"x5 3-way with cupping         Lateral glide on wall     10x R and L ea        Ther Ex             bike nv 8' 10' 10' 10' 10' 10' 8'     Hamstring stretch nv            Standing hip 4 way nv     nv BTB 20x ea BTB 20x ea     Leg press nv   70# 10x         LAQ      nv                                              Ther Activity                                       Gait Training                                       Modalities

## 2023-01-10 ENCOUNTER — HOSPITAL ENCOUNTER (OUTPATIENT)
Dept: RADIOLOGY | Facility: HOSPITAL | Age: 43
Discharge: HOME | End: 2023-01-10
Attending: EMERGENCY MEDICINE

## 2023-01-10 DIAGNOSIS — Z98.1 HISTORY OF THORACIC SPINAL FUSION: ICD-10-CM

## 2023-01-10 DIAGNOSIS — M54.50 CHRONIC LEFT-SIDED LOW BACK PAIN WITHOUT SCIATICA: ICD-10-CM

## 2023-01-10 DIAGNOSIS — G89.29 CHRONIC LEFT-SIDED LOW BACK PAIN WITHOUT SCIATICA: ICD-10-CM
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
## 2023-01-17 ENCOUNTER — CONSULT (OUTPATIENT)
Dept: OBGYN CLINIC | Facility: CLINIC | Age: 43
End: 2023-01-17

## 2023-01-17 VITALS
HEIGHT: 63 IN | DIASTOLIC BLOOD PRESSURE: 62 MMHG | SYSTOLIC BLOOD PRESSURE: 100 MMHG | WEIGHT: 163 LBS | BODY MASS INDEX: 28.88 KG/M2

## 2023-01-17 DIAGNOSIS — Z98.1 HISTORY OF THORACIC SPINAL FUSION: ICD-10-CM

## 2023-01-17 DIAGNOSIS — M62.9 HAMSTRING TIGHTNESS OF BOTH LOWER EXTREMITIES: ICD-10-CM

## 2023-01-17 DIAGNOSIS — G89.29 CHRONIC LEFT-SIDED LOW BACK PAIN WITHOUT SCIATICA: Primary | ICD-10-CM

## 2023-01-17 DIAGNOSIS — M54.50 CHRONIC LEFT-SIDED LOW BACK PAIN WITHOUT SCIATICA: Primary | ICD-10-CM

## 2023-01-17 DIAGNOSIS — M62.89 QUADRICEP TIGHTNESS: ICD-10-CM

## 2023-01-23 ENCOUNTER — HOSPITAL ENCOUNTER (OUTPATIENT)
Dept: RADIOLOGY | Facility: IMAGING CENTER | Age: 43
Discharge: HOME | End: 2023-01-23

## 2023-01-23 ENCOUNTER — CONSULT (OUTPATIENT)
Dept: PAIN MEDICINE | Facility: CLINIC | Age: 43
End: 2023-01-23

## 2023-01-23 VITALS
HEART RATE: 70 BPM | BODY MASS INDEX: 28.53 KG/M2 | WEIGHT: 161 LBS | DIASTOLIC BLOOD PRESSURE: 62 MMHG | SYSTOLIC BLOOD PRESSURE: 100 MMHG | OXYGEN SATURATION: 96 % | HEIGHT: 63 IN

## 2023-01-23 DIAGNOSIS — M79.18 MYOFASCIAL PAIN SYNDROME: ICD-10-CM

## 2023-01-23 DIAGNOSIS — G89.29 CHRONIC LEFT-SIDED LOW BACK PAIN WITHOUT SCIATICA: ICD-10-CM

## 2023-01-23 DIAGNOSIS — M54.50 CHRONIC LEFT-SIDED LOW BACK PAIN WITHOUT SCIATICA: ICD-10-CM

## 2023-01-23 DIAGNOSIS — Z98.1 HISTORY OF THORACIC SPINAL FUSION: ICD-10-CM

## 2023-01-23 DIAGNOSIS — G89.29 CHRONIC LEFT-SIDED LOW BACK PAIN WITHOUT SCIATICA: Primary | ICD-10-CM

## 2023-01-23 DIAGNOSIS — R29.898 WEAKNESS OF LEFT LOWER EXTREMITY: ICD-10-CM

## 2023-01-23 DIAGNOSIS — M54.50 CHRONIC LEFT-SIDED LOW BACK PAIN WITHOUT SCIATICA: Primary | ICD-10-CM

## 2023-01-23 RX ORDER — BUPIVACAINE HYDROCHLORIDE 2.5 MG/ML
5 INJECTION, SOLUTION INFILTRATION; PERINEURAL ONCE
Status: DISCONTINUED | OUTPATIENT
Start: 2023-01-23 | End: 2023-01-23

## 2023-01-23 RX ORDER — TRIAMCINOLONE ACETONIDE 40 MG/ML
40 INJECTION, SUSPENSION INTRA-ARTICULAR; INTRAMUSCULAR ONCE
Status: COMPLETED | OUTPATIENT
Start: 2023-01-23 | End: 2023-01-23

## 2023-01-23 RX ORDER — BUPIVACAINE HYDROCHLORIDE 5 MG/ML
5 INJECTION, SOLUTION PERINEURAL ONCE
Status: COMPLETED | OUTPATIENT
Start: 2023-01-23 | End: 2023-01-23

## 2023-01-23 RX ADMIN — TRIAMCINOLONE ACETONIDE 40 MG: 40 INJECTION, SUSPENSION INTRA-ARTICULAR; INTRAMUSCULAR at 14:01

## 2023-01-23 RX ADMIN — BUPIVACAINE HYDROCHLORIDE 5 ML: 5 INJECTION, SOLUTION PERINEURAL at 14:23

## 2023-01-23 NOTE — PROGRESS NOTES
Procedures  PROCEDURE NOTE  Diagnosis: Myofascial pain  Procedure: trigger point injections - bilateral thoracic paraspinals, lumbar paraspinals, erector spinae (4 sites total)    Medications: 6 cc used of 10cc mixture containing 1cc 40mg/mL Kenalog with 9cc 0 5% bupivacaine    After discussing the risks, benefits, and alternatives to the procedure, the patient expressed understanding and wished to proceed  The patient was placed in the sitting position  A procedural pause was conducted to verify: Correct patient identity, procedure to be performed and as applicable, correct side and site, correct patient position, and availability of implants, special equipment or special requirements  Following this, the area was prepped with Alcohol  A 27G 1 5" needle was advanced into identified trigger points  1-2cc of above injectate was given into each trigger point  The patient tolerated the procedure well and there were no apparent complications
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
## 2023-03-16 ENCOUNTER — OFFICE VISIT (OUTPATIENT)
Dept: FAMILY MEDICINE CLINIC | Facility: CLINIC | Age: 43
End: 2023-03-16

## 2023-03-16 VITALS
HEART RATE: 72 BPM | RESPIRATION RATE: 16 BRPM | SYSTOLIC BLOOD PRESSURE: 106 MMHG | OXYGEN SATURATION: 99 % | WEIGHT: 162 LBS | TEMPERATURE: 98 F | DIASTOLIC BLOOD PRESSURE: 70 MMHG | HEIGHT: 63 IN | BODY MASS INDEX: 28.7 KG/M2

## 2023-03-16 DIAGNOSIS — Z23 NEED FOR TDAP VACCINATION: ICD-10-CM

## 2023-03-16 DIAGNOSIS — Z00.00 ANNUAL PHYSICAL EXAM: Primary | ICD-10-CM

## 2023-03-16 RX ORDER — TOPIRAMATE 25 MG/1
25 TABLET ORAL 2 TIMES DAILY
COMMUNITY
Start: 2023-02-09
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
## 2023-04-07 ENCOUNTER — APPOINTMENT (OUTPATIENT)
Dept: LAB | Facility: HOSPITAL | Age: 43
End: 2023-04-07

## 2023-04-07 DIAGNOSIS — Z00.00 ANNUAL PHYSICAL EXAM: ICD-10-CM

## 2023-04-07 DIAGNOSIS — R29.898 WEAKNESS OF LEFT LOWER EXTREMITY: ICD-10-CM

## 2023-04-07 DIAGNOSIS — Z98.1 HISTORY OF THORACIC SPINAL FUSION: ICD-10-CM

## 2023-04-07 LAB
ALBUMIN SERPL BCP-MCNC: 4.6 G/DL (ref 3.5–5)
ALP SERPL-CCNC: 47 U/L (ref 34–104)
ALT SERPL W P-5'-P-CCNC: 13 U/L (ref 7–52)
ANION GAP SERPL CALCULATED.3IONS-SCNC: 8 MMOL/L (ref 4–13)
AST SERPL W P-5'-P-CCNC: 15 U/L (ref 13–39)
BASOPHILS # BLD AUTO: 0.04 THOUSANDS/ÂΜL (ref 0–0.1)
BASOPHILS NFR BLD AUTO: 1 % (ref 0–1)
BILIRUB SERPL-MCNC: 0.4 MG/DL (ref 0.2–1)
BUN SERPL-MCNC: 11 MG/DL (ref 5–25)
CALCIUM SERPL-MCNC: 9.6 MG/DL (ref 8.4–10.2)
CHLORIDE SERPL-SCNC: 102 MMOL/L (ref 96–108)
CHOLEST SERPL-MCNC: 186 MG/DL
CO2 SERPL-SCNC: 29 MMOL/L (ref 21–32)
CREAT SERPL-MCNC: 0.75 MG/DL (ref 0.6–1.3)
EOSINOPHIL # BLD AUTO: 0.13 THOUSAND/ÂΜL (ref 0–0.61)
EOSINOPHIL NFR BLD AUTO: 2 % (ref 0–6)
ERYTHROCYTE [DISTWIDTH] IN BLOOD BY AUTOMATED COUNT: 12.6 % (ref 11.6–15.1)
GFR SERPL CREATININE-BSD FRML MDRD: 98 ML/MIN/1.73SQ M
GLUCOSE P FAST SERPL-MCNC: 94 MG/DL (ref 65–99)
HCT VFR BLD AUTO: 40.9 % (ref 34.8–46.1)
HDLC SERPL-MCNC: 65 MG/DL
HGB BLD-MCNC: 13 G/DL (ref 11.5–15.4)
IMM GRANULOCYTES # BLD AUTO: 0.01 THOUSAND/UL (ref 0–0.2)
IMM GRANULOCYTES NFR BLD AUTO: 0 % (ref 0–2)
LDLC SERPL CALC-MCNC: 108 MG/DL (ref 0–100)
LYMPHOCYTES # BLD AUTO: 2.25 THOUSANDS/ÂΜL (ref 0.6–4.47)
LYMPHOCYTES NFR BLD AUTO: 41 % (ref 14–44)
MCH RBC QN AUTO: 28.4 PG (ref 26.8–34.3)
MCHC RBC AUTO-ENTMCNC: 31.8 G/DL (ref 31.4–37.4)
MCV RBC AUTO: 90 FL (ref 82–98)
MONOCYTES # BLD AUTO: 0.38 THOUSAND/ÂΜL (ref 0.17–1.22)
MONOCYTES NFR BLD AUTO: 7 % (ref 4–12)
NEUTROPHILS # BLD AUTO: 2.75 THOUSANDS/ÂΜL (ref 1.85–7.62)
NEUTS SEG NFR BLD AUTO: 49 % (ref 43–75)
NONHDLC SERPL-MCNC: 121 MG/DL
NRBC BLD AUTO-RTO: 0 /100 WBCS
PLATELET # BLD AUTO: 317 THOUSANDS/UL (ref 149–390)
PMV BLD AUTO: 10 FL (ref 8.9–12.7)
POTASSIUM SERPL-SCNC: 3.7 MMOL/L (ref 3.5–5.3)
PROT SERPL-MCNC: 7.5 G/DL (ref 6.4–8.4)
RBC # BLD AUTO: 4.57 MILLION/UL (ref 3.81–5.12)
SODIUM SERPL-SCNC: 139 MMOL/L (ref 135–147)
TRIGL SERPL-MCNC: 65 MG/DL
WBC # BLD AUTO: 5.56 THOUSAND/UL (ref 4.31–10.16)

## 2023-04-18 PROBLEM — Z12.31 ENCOUNTER FOR SCREENING MAMMOGRAM FOR MALIGNANT NEOPLASM OF BREAST: Status: RESOLVED | Noted: 2022-03-08 | Resolved: 2023-04-18

## 2023-04-18 PROBLEM — U07.1 COVID-19: Status: RESOLVED | Noted: 2021-12-30 | Resolved: 2023-04-18

## 2023-04-18 PROBLEM — R79.89 ABNORMAL TSH: Status: RESOLVED | Noted: 2021-03-04 | Resolved: 2023-04-18

## 2024-03-19 ENCOUNTER — OFFICE VISIT (OUTPATIENT)
Dept: FAMILY MEDICINE CLINIC | Facility: CLINIC | Age: 44
End: 2024-03-19
Payer: COMMERCIAL

## 2024-03-19 VITALS
HEART RATE: 76 BPM | TEMPERATURE: 98 F | DIASTOLIC BLOOD PRESSURE: 70 MMHG | HEIGHT: 63 IN | RESPIRATION RATE: 16 BRPM | BODY MASS INDEX: 30.12 KG/M2 | SYSTOLIC BLOOD PRESSURE: 120 MMHG | OXYGEN SATURATION: 98 % | WEIGHT: 170 LBS

## 2024-03-19 DIAGNOSIS — Z00.01 ENCOUNTER FOR GENERAL ADULT MEDICAL EXAMINATION WITH ABNORMAL FINDINGS: ICD-10-CM

## 2024-03-19 DIAGNOSIS — R53.82 CHRONIC FATIGUE: ICD-10-CM

## 2024-03-19 DIAGNOSIS — Z12.31 BREAST CANCER SCREENING BY MAMMOGRAM: Primary | ICD-10-CM

## 2024-03-19 PROCEDURE — 99396 PREV VISIT EST AGE 40-64: CPT | Performed by: FAMILY MEDICINE

## 2024-03-19 NOTE — PROGRESS NOTES
"Name: Carol Marin      : 1980      MRN: [id number]  Encounter Provider: Gwyn Lopez MD  Encounter Date: 3/19/2024   Encounter department: [facility] PRIMARY CARE [facility]      Chief Complaint   Patient presents with    Physical Exam    Weight Gain       Subjective      HPI  The patient reports a weight increase from 162 to 170 pounds over recent visits, exceeding her baseline of 160-161 pounds for years. She notes difficulty in losing weight, due changes in job less active level. Additionally, she expresses concern about a potential genetic heart condition mentioned by her child's Cardiologist,  prompting the need for her own evaluation   Allergies   Allergen Reactions    No Known Allergies        Current Outpatient Medications on File Prior to Visit   Medication Sig    [DISCONTINUED] topiramate (TOPAMAX) 25 mg tablet Take 25 mg by mouth 2 (two) times a day     No current facility-administered medications on file prior to visit.        History reviewed. No pertinent past medical history.     Review of Systems    Respiratory: Negative.  Negative for apnea, cough, shortness of breath and wheezing.    Cardiovascular: Negative.  Negative for chest pain and palpitations.   Gastrointestinal: Negative.  Negative for bowel incontinence.   Endocrine: Negative.  Negative for polydipsia and polyuria.   Genitourinary: Negative.  Negative for bladder incontinence, dysuria and pelvic pain.   Musculoskeletal: negative, history of back surgery.     Objective   /70 (BP Location: Left arm, Patient Position: Sitting, Cuff Size: Standard)   Pulse 76   Temp 98 °F (36.7 °C) (Tympanic)   Resp 16   Ht 5' 3\" (1.6 m)   Wt 77.1 kg (170 lb)   SpO2 98%   BMI 30.11 kg/m²     Physical Exam  She looks in non distress; oriented x 3 Person, Place, and Day, HEENT unremarkable.  Lungs are clear. Heart is having Normal rhythm w/o murmurs.  Abdomen is soft, non tender. MS: Muscle strength and tone were " normal.  Neurological system has no deficit and walks normal.    Assessment & Plan   1. Breast cancer screening by mammogram  -     Mammo screening bilateral w 3d & cad; Future; Expected date: 04/02/2024    2. Encounter for general adult medical examination with abnormal findings  -     CBC and differential; Future  -     Lipid Panel with Direct LDL reflex; Future  -     Comprehensive metabolic panel; Future    3. Chronic fatigue  -     TSH, 3rd generation with Free T4 reflex; Future    Weight Management: Encourage dietary modifications and increased physical activity. Consider referral to a nutritionist for weight management support. Pending thyroid function tests to rule out metabolic causes.  Genetic Heart Condition: Recommend genetic counseling and possible genetic testing to determine if the patient carries the same genetic condition as her child. Coordination with the child's cardiologist for family history assessment is advised.  Perimenopausal Symptoms: Discuss the management of perimenopausal symptoms and consider hormone levels evaluation if symptoms are distressing.  Mammogram: Reissue mammogram order that was lost last year and ensure patient schedules the screening.        Gwyn Lopez MD

## 2024-04-18 ENCOUNTER — LAB (OUTPATIENT)
Dept: LAB | Facility: HOSPITAL | Age: 44
End: 2024-04-18
Payer: COMMERCIAL

## 2024-04-18 DIAGNOSIS — Z00.01 ENCOUNTER FOR GENERAL ADULT MEDICAL EXAMINATION WITH ABNORMAL FINDINGS: ICD-10-CM

## 2024-04-18 DIAGNOSIS — R53.82 CHRONIC FATIGUE: ICD-10-CM

## 2024-04-18 LAB
ALBUMIN SERPL BCP-MCNC: 4.5 G/DL (ref 3.5–5)
ALP SERPL-CCNC: 54 U/L (ref 34–104)
ALT SERPL W P-5'-P-CCNC: 15 U/L (ref 7–52)
ANION GAP SERPL CALCULATED.3IONS-SCNC: 8 MMOL/L (ref 4–13)
AST SERPL W P-5'-P-CCNC: 16 U/L (ref 13–39)
BASOPHILS # BLD AUTO: 0.04 THOUSANDS/ÂΜL (ref 0–0.1)
BASOPHILS NFR BLD AUTO: 1 % (ref 0–1)
BILIRUB SERPL-MCNC: 0.52 MG/DL (ref 0.2–1)
BUN SERPL-MCNC: 8 MG/DL (ref 5–25)
CALCIUM SERPL-MCNC: 9 MG/DL (ref 8.4–10.2)
CHLORIDE SERPL-SCNC: 102 MMOL/L (ref 96–108)
CHOLEST SERPL-MCNC: 152 MG/DL
CO2 SERPL-SCNC: 28 MMOL/L (ref 21–32)
CREAT SERPL-MCNC: 0.75 MG/DL (ref 0.6–1.3)
EOSINOPHIL # BLD AUTO: 0.09 THOUSAND/ÂΜL (ref 0–0.61)
EOSINOPHIL NFR BLD AUTO: 1 % (ref 0–6)
ERYTHROCYTE [DISTWIDTH] IN BLOOD BY AUTOMATED COUNT: 12.6 % (ref 11.6–15.1)
GFR SERPL CREATININE-BSD FRML MDRD: 97 ML/MIN/1.73SQ M
GLUCOSE P FAST SERPL-MCNC: 91 MG/DL (ref 65–99)
HCT VFR BLD AUTO: 39.7 % (ref 34.8–46.1)
HDLC SERPL-MCNC: 55 MG/DL
HGB BLD-MCNC: 12.9 G/DL (ref 11.5–15.4)
IMM GRANULOCYTES # BLD AUTO: 0.02 THOUSAND/UL (ref 0–0.2)
IMM GRANULOCYTES NFR BLD AUTO: 0 % (ref 0–2)
LDLC SERPL CALC-MCNC: 84 MG/DL (ref 0–100)
LYMPHOCYTES # BLD AUTO: 2.23 THOUSANDS/ÂΜL (ref 0.6–4.47)
LYMPHOCYTES NFR BLD AUTO: 34 % (ref 14–44)
MCH RBC QN AUTO: 28.9 PG (ref 26.8–34.3)
MCHC RBC AUTO-ENTMCNC: 32.5 G/DL (ref 31.4–37.4)
MCV RBC AUTO: 89 FL (ref 82–98)
MONOCYTES # BLD AUTO: 0.47 THOUSAND/ÂΜL (ref 0.17–1.22)
MONOCYTES NFR BLD AUTO: 7 % (ref 4–12)
NEUTROPHILS # BLD AUTO: 3.8 THOUSANDS/ÂΜL (ref 1.85–7.62)
NEUTS SEG NFR BLD AUTO: 57 % (ref 43–75)
NRBC BLD AUTO-RTO: 0 /100 WBCS
PLATELET # BLD AUTO: 325 THOUSANDS/UL (ref 149–390)
PMV BLD AUTO: 9.8 FL (ref 8.9–12.7)
POTASSIUM SERPL-SCNC: 4 MMOL/L (ref 3.5–5.3)
PROT SERPL-MCNC: 7.5 G/DL (ref 6.4–8.4)
RBC # BLD AUTO: 4.46 MILLION/UL (ref 3.81–5.12)
SODIUM SERPL-SCNC: 138 MMOL/L (ref 135–147)
TRIGL SERPL-MCNC: 63 MG/DL
TSH SERPL DL<=0.05 MIU/L-ACNC: 3.28 UIU/ML (ref 0.45–4.5)
WBC # BLD AUTO: 6.65 THOUSAND/UL (ref 4.31–10.16)

## 2024-04-18 PROCEDURE — 36415 COLL VENOUS BLD VENIPUNCTURE: CPT

## 2024-04-18 PROCEDURE — 80053 COMPREHEN METABOLIC PANEL: CPT

## 2024-04-18 PROCEDURE — 85025 COMPLETE CBC W/AUTO DIFF WBC: CPT

## 2024-04-18 PROCEDURE — 84443 ASSAY THYROID STIM HORMONE: CPT

## 2024-04-18 PROCEDURE — 80061 LIPID PANEL: CPT

## 2024-10-17 ENCOUNTER — LAB (OUTPATIENT)
Dept: LAB | Facility: HOSPITAL | Age: 44
End: 2024-10-17
Payer: COMMERCIAL

## 2024-10-17 ENCOUNTER — OFFICE VISIT (OUTPATIENT)
Dept: FAMILY MEDICINE CLINIC | Facility: CLINIC | Age: 44
End: 2024-10-17
Payer: COMMERCIAL

## 2024-10-17 VITALS
WEIGHT: 174 LBS | OXYGEN SATURATION: 98 % | RESPIRATION RATE: 16 BRPM | TEMPERATURE: 97.9 F | DIASTOLIC BLOOD PRESSURE: 70 MMHG | SYSTOLIC BLOOD PRESSURE: 118 MMHG | BODY MASS INDEX: 30.83 KG/M2 | HEIGHT: 63 IN | HEART RATE: 72 BPM

## 2024-10-17 DIAGNOSIS — Z23 NEEDS FLU SHOT: ICD-10-CM

## 2024-10-17 DIAGNOSIS — G25.81 RESTLESS LEG SYNDROME: Primary | ICD-10-CM

## 2024-10-17 DIAGNOSIS — G25.81 RESTLESS LEG SYNDROME: ICD-10-CM

## 2024-10-17 LAB
FERRITIN SERPL-MCNC: 41 NG/ML (ref 11–307)
IRON SATN MFR SERPL: 17 % (ref 15–50)
IRON SERPL-MCNC: 64 UG/DL (ref 50–212)
TIBC SERPL-MCNC: 386 UG/DL (ref 250–450)
UIBC SERPL-MCNC: 322 UG/DL (ref 155–355)

## 2024-10-17 PROCEDURE — 90656 IIV3 VACC NO PRSV 0.5 ML IM: CPT

## 2024-10-17 PROCEDURE — 83540 ASSAY OF IRON: CPT

## 2024-10-17 PROCEDURE — 99214 OFFICE O/P EST MOD 30 MIN: CPT | Performed by: FAMILY MEDICINE

## 2024-10-17 PROCEDURE — 90471 IMMUNIZATION ADMIN: CPT

## 2024-10-17 PROCEDURE — 36415 COLL VENOUS BLD VENIPUNCTURE: CPT

## 2024-10-17 PROCEDURE — 83550 IRON BINDING TEST: CPT

## 2024-10-17 PROCEDURE — 82728 ASSAY OF FERRITIN: CPT

## 2024-10-17 NOTE — PROGRESS NOTES
Ambulatory Visit  Name: Carol Marin      : 1980      MRN: [id number]  Encounter Provider: Gwyn Lopez MD  Encounter Date: 10/17/2024   Encounter department: [facility]    Assessment & Plan  Restless leg syndrome  1. Restless Leg Syndrome (RLS): The patient's symptoms of nocturnal leg discomfort and the need to move the leg suggest RLS. Plan to check ferritin levels to assess for iron deficiency, which can be associated with RLS. If ferritin is low, initiate iron supplementation. Consider pharmacological treatment if symptoms persist and ferritin levels are normal.  2. Rule Out Neurological Causes: Given the persistence of symptoms and lack of response to previous evaluations, consider referral to a neurologist if no improvement is noted after addressing potential iron deficiency.  3. Follow-up: Will communicate test results via [employer] and discuss further management based on findings.  Orders:    Iron Panel (Includes Ferritin, Iron Sat%, Iron, and TIBC); Future    Needs flu shot    Orders:    influenza vaccine preservative-free 0.5 mL IM (Fluzone, Afluria, Fluarix, Flulaval)       History of Present Illness     Carol, a 44-year-old female, presents with complaints of leg pain characterized by a sensation of heaviness and discomfort primarily occurring at night, which disrupts her sleep. She describes the need to move or stretch her leg to alleviate the discomfort, which is not described as severe pain but rather a bothersome sensation. The symptoms are unilateral, affecting only one leg from the knee downwards. She reports no significant changes in symptoms during a recent trip to [place]. Carol denies any recent anemia or changes in menstrual patterns. She has previously undergone spinal examinations, which did not reveal any nerve-related issues.      Chief Complaint   Patient presents with    Leg Pain     Left side      History obtained from  "patient.    Review of Systems  History reviewed. No pertinent past medical history.  Past Surgical History:   Procedure Laterality Date    BACK SURGERY      ESOPHAGOGASTRODUODENOSCOPY  04/2015    gastritis           Objective     /70 (BP Location: Left arm, Patient Position: Sitting, Cuff Size: Standard)   Pulse 72   Temp 97.9 °F (36.6 °C) (Tympanic)   Resp 16   Ht 5' 3\" (1.6 m)   Wt 78.9 kg (174 lb)   SpO2 98%   BMI 30.82 kg/m²     Physical Exam  Non distress, normal respiratory effort. Pulse is regular. Heart without murmurs.     I have spent 35 minutes with Carol today in which greater than 50% of this time was spent in counseling/coordination of care regarding Diagnostic results, Prognosis, Risks and benefits of tx options, Counseling / Coordination of care, Reviewing / ordering tests, medicine, procedures.  Please note this time includes cumulative time on the day of encounter, including reviewing medical records and/or coordinating care among the patient's other specialists.    "

## 2024-10-18 DIAGNOSIS — G25.81 RESTLESS LEG SYNDROME: Primary | ICD-10-CM

## 2024-10-18 RX ORDER — ROPINIROLE 1 MG/1
1 TABLET, FILM COATED ORAL
Qty: 30 TABLET | Refills: 0 | Status: SHIPPED | OUTPATIENT
Start: 2024-10-18

## 2024-10-18 NOTE — RESULT ENCOUNTER NOTE
Dear Carol, You do not have a deficiency in ferritin. I will send a prescription to the pharmacy for a medication to address your leg problem. Please take Ropinirole 1 mg at bedtime. Additionally, here are some strategies to help improve your situation: 1. **Sleep Hygiene:** Establish a regular sleep schedule and create a comfortable sleep environment. 2. **Exercise:** Engage in regular physical activity to help alleviate symptoms, but avoid vigorous exercise close to bedtime. 3. **Dietary Changes:** Limit your intake of caffeine, alcohol, and nicotine just in case, even second hand, as these can worsen your symptoms. 4. **Warm or Cold Compresses:** Applying heat or cold to your legs may provide relief. Best regards,  Dr. SANTOS

## 2025-03-25 ENCOUNTER — OFFICE VISIT (OUTPATIENT)
Dept: FAMILY MEDICINE CLINIC | Facility: CLINIC | Age: 45
End: 2025-03-25
Payer: COMMERCIAL

## 2025-03-25 VITALS
WEIGHT: 176 LBS | RESPIRATION RATE: 16 BRPM | OXYGEN SATURATION: 96 % | TEMPERATURE: 97.8 F | HEIGHT: 63 IN | HEART RATE: 95 BPM | SYSTOLIC BLOOD PRESSURE: 106 MMHG | BODY MASS INDEX: 31.18 KG/M2 | DIASTOLIC BLOOD PRESSURE: 70 MMHG

## 2025-03-25 DIAGNOSIS — R06.83 SNORING: ICD-10-CM

## 2025-03-25 DIAGNOSIS — R06.81 WITNESSED EPISODE OF APNEA: ICD-10-CM

## 2025-03-25 DIAGNOSIS — E66.9 OBESITY (BMI 30-39.9): ICD-10-CM

## 2025-03-25 DIAGNOSIS — Z00.01 ENCOUNTER FOR GENERAL ADULT MEDICAL EXAMINATION WITH ABNORMAL FINDINGS: Primary | ICD-10-CM

## 2025-03-25 PROCEDURE — 99396 PREV VISIT EST AGE 40-64: CPT | Performed by: FAMILY MEDICINE

## 2025-03-25 PROCEDURE — 99214 OFFICE O/P EST MOD 30 MIN: CPT | Performed by: FAMILY MEDICINE

## 2025-03-25 RX ORDER — TIRZEPATIDE 2.5 MG/.5ML
2.5 INJECTION, SOLUTION SUBCUTANEOUS WEEKLY
Qty: 2 ML | Refills: 0 | Status: SHIPPED | COMMUNITY
Start: 2025-03-25

## 2025-03-25 NOTE — PROGRESS NOTES
Name: Carol Marin      : 1980      MRN: [id number]  Encounter Provider: Gwyn Lopez MD  Encounter Date: 3/25/2025   Encounter department: [facility] PRIMARY CARE [facility]    Assessment & Plan  Encounter for general adult medical examination with abnormal findings  Carol is here for a physical exam. I found her without any distress. The patient has the following chronic conditions   Patient Active Problem List   Diagnosis    Overweight (BMI 25.0-29.9)    Toenail fungus    Mixed hyperlipidemia    Hyperglycemia    Plantar fasciitis of left foot    Need for Tdap vaccination    Chronic midline low back pain with left-sided sciatica    Annual physical exam    Other fatigue    BMI 28.0-28.9,adult   .   I recommended that she exercise for at least 3.5 hours each week and maintain a healthy diet low in carbohydrates and saturated fats. I provided her with information about lifestyle modifications. The patient understands the importance of having an annual physical exam.     Orders:    CBC and differential; Future    Comprehensive metabolic panel; Future    Lipid Panel with Direct LDL reflex; Future    Obesity (BMI 30-39.9)   Obesity with recent weight gain  - Current weight 176 lbs, BMI 31.18  - Notable weight gain trend: 15 lbs increase from previous visits  - Plan: Initiate weight management treatment  - Will order home sleep study to evaluate for sleep apnea  - If sleep study positive, will pursue insurance coverage for weight loss medication     Chronic Back Pain  - History of injury from . Weight control needed.  - Continue current management plan  - Will review medication regimen    Orders:    Tirzepatide (Mounjaro) 2.5 MG/0.5ML SOAJ; Inject 2.5 mg under the skin once a week    Snoring  Suspect RENATO, related to obesity and rapid weight gain./       Witnessed episode of apnea   Suspected Sleep Apnea  - Symptoms include frequent nighttime urination, documented snoring  - Ordering  "home sleep study  - Will evaluate for CPAP needs based on results          Annual Physical Exam with Weight Gain Concerns    Assessment and Plan:  Subjective:  45-year-old female presents for annual physical exam with primary concern of weight gain. Patient reports progressive weight gain of approximately 10 pounds per year, currently weighing 176 pounds. She expresses significant concern about this trend, noting this is the heaviest she has been. Patient also reports sleep disturbances with frequent nighttime awakening to use the bathroom (2-3 times per night). Her partner has documented her snoring and played recordings to demonstrate the severity. Review of family history reveals three paternal half-sisters: one with lupus, others with diabetes and hypertension. Patient denies personal history of these conditions. Chronic back pain persists since a 2003 accident. No family history of thyroid cancer reported.    Allergies   Allergen Reactions    No Known Allergies        Patient Active Problem List   Diagnosis    Overweight (BMI 25.0-29.9)    Toenail fungus    Mixed hyperlipidemia    Hyperglycemia    Plantar fasciitis of left foot    Need for Tdap vaccination    Chronic midline low back pain with left-sided sciatica    Annual physical exam    Other fatigue    BMI 28.0-28.9,adult         Current Outpatient Medications:     Tirzepatide (Mounjaro) 2.5 MG/0.5ML SOAJ, Inject 2.5 mg under the skin once a week, Disp: 2 mL, Rfl: 0    rOPINIRole (REQUIP) 1 mg tablet, Take 1 tablet (1 mg total) by mouth daily at bedtime (Patient not taking: Reported on 3/25/2025), Disp: 30 tablet, Rfl: 0    /70 (BP Location: Left arm, Patient Position: Sitting, Cuff Size: Standard)   Pulse 95   Temp 97.8 °F (36.6 °C) (Tympanic)   Resp 16   Ht 5' 3\" (1.6 m)   Wt 79.8 kg (176 lb)   LMP 03/12/2025   SpO2 96%   BMI 31.18 kg/m²       Physical Examination:  - Neck: No thyroid lesions noted  - Abdomen: Liver of normal size  - " Extremities: Stable knees, no edema  - Back: Chronic changes consistent with previous injury    Historical Weight Trends:  - 2021: 165 lbs  - Previous: 161 lbs  - Current: 176 lbs    Answers submitted by the patient for this visit:  Annual Physical (Submitted on 3/25/2025)  Do you currently have an OB/GYN provider that you routinely follow with?: Yes  Last menstrual cycle (if applicable):: 3/12/2025  Any history of sexual transmitted disease/infection?: No  Do you have an advance directive/living will?: No  Do you have a durable power of  (POA)?: No

## 2025-03-26 ENCOUNTER — TELEPHONE (OUTPATIENT)
Age: 45
End: 2025-03-26

## 2025-03-26 ENCOUNTER — TELEPHONE (OUTPATIENT)
Dept: ADMINISTRATIVE | Facility: OTHER | Age: 45
End: 2025-03-26

## 2025-03-26 NOTE — TELEPHONE ENCOUNTER
Upon review of the In Basket request we were able to locate, review, and update the patient chart as requested for Pap Smear (HPV) aka Cervical Cancer Screening.    Any additional questions or concerns should be emailed to the Practice Liaisons via the appropriate education email address, please do not reply via In Basket.    Thank you  Teja Castillo MA   [facility]

## 2025-03-26 NOTE — TELEPHONE ENCOUNTER
----- Message from Nan GILLIAM sent at 3/25/2025  2:15 PM EDT -----  Regarding: pap  03/25/25 2:15 PM    Hello, our patient Carol Marin has had Pap Smear (HPV) aka Cervical Cancer Screening completed/performed. Please assist in updating the patient chart by pulling the Care Everywhere (CE) document. The date of service is 2024.     Thank you,  Nan Lopez, RN  [facility]  PRIMARY CARE [facility]

## 2025-03-26 NOTE — TELEPHONE ENCOUNTER
Tirzepatide (Mounjaro) 2.5 MG/0.5M  will not be covered with or without a prior authorization, Tirzepatide (Mounjaro) 2.5 MG/0.5M  is not FDA approved for obesity, prediabetes, etc. Tirzepatide (Mounjaro) 2.5 MG/0.5M  is only FDA Approved for Type 2 Diabetes and will only be Approved via a Prior Authorization if patient has a diagnosis of Type 2 Diabetes.

## 2025-04-01 ENCOUNTER — APPOINTMENT (OUTPATIENT)
Dept: LAB | Facility: HOSPITAL | Age: 45
End: 2025-04-01
Payer: COMMERCIAL

## 2025-04-01 DIAGNOSIS — Z00.01 ENCOUNTER FOR GENERAL ADULT MEDICAL EXAMINATION WITH ABNORMAL FINDINGS: ICD-10-CM

## 2025-04-01 LAB
ALBUMIN SERPL BCG-MCNC: 4.8 G/DL (ref 3.5–5)
ALP SERPL-CCNC: 52 U/L (ref 34–104)
ALT SERPL W P-5'-P-CCNC: 14 U/L (ref 7–52)
ANION GAP SERPL CALCULATED.3IONS-SCNC: 9 MMOL/L (ref 4–13)
AST SERPL W P-5'-P-CCNC: 15 U/L (ref 13–39)
BASOPHILS # BLD AUTO: 0.05 THOUSANDS/ÂΜL (ref 0–0.1)
BASOPHILS NFR BLD AUTO: 1 % (ref 0–1)
BILIRUB SERPL-MCNC: 0.54 MG/DL (ref 0.2–1)
BUN SERPL-MCNC: 10 MG/DL (ref 5–25)
CALCIUM SERPL-MCNC: 9.3 MG/DL (ref 8.4–10.2)
CHLORIDE SERPL-SCNC: 103 MMOL/L (ref 96–108)
CHOLEST SERPL-MCNC: 155 MG/DL (ref ?–200)
CO2 SERPL-SCNC: 27 MMOL/L (ref 21–32)
CREAT SERPL-MCNC: 0.78 MG/DL (ref 0.6–1.3)
EOSINOPHIL # BLD AUTO: 0.14 THOUSAND/ÂΜL (ref 0–0.61)
EOSINOPHIL NFR BLD AUTO: 2 % (ref 0–6)
ERYTHROCYTE [DISTWIDTH] IN BLOOD BY AUTOMATED COUNT: 12.6 % (ref 11.6–15.1)
GFR SERPL CREATININE-BSD FRML MDRD: 92 ML/MIN/1.73SQ M
GLUCOSE P FAST SERPL-MCNC: 98 MG/DL (ref 65–99)
HCT VFR BLD AUTO: 40.5 % (ref 34.8–46.1)
HDLC SERPL-MCNC: 54 MG/DL
HGB BLD-MCNC: 13.1 G/DL (ref 11.5–15.4)
IMM GRANULOCYTES # BLD AUTO: 0.03 THOUSAND/UL (ref 0–0.2)
IMM GRANULOCYTES NFR BLD AUTO: 0 % (ref 0–2)
LDLC SERPL CALC-MCNC: 88 MG/DL (ref 0–100)
LYMPHOCYTES # BLD AUTO: 2.54 THOUSANDS/ÂΜL (ref 0.6–4.47)
LYMPHOCYTES NFR BLD AUTO: 32 % (ref 14–44)
MCH RBC QN AUTO: 29 PG (ref 26.8–34.3)
MCHC RBC AUTO-ENTMCNC: 32.3 G/DL (ref 31.4–37.4)
MCV RBC AUTO: 90 FL (ref 82–98)
MONOCYTES # BLD AUTO: 0.6 THOUSAND/ÂΜL (ref 0.17–1.22)
MONOCYTES NFR BLD AUTO: 8 % (ref 4–12)
NEUTROPHILS # BLD AUTO: 4.58 THOUSANDS/ÂΜL (ref 1.85–7.62)
NEUTS SEG NFR BLD AUTO: 57 % (ref 43–75)
NRBC BLD AUTO-RTO: 0 /100 WBCS
PLATELET # BLD AUTO: 294 THOUSANDS/UL (ref 149–390)
PMV BLD AUTO: 9.2 FL (ref 8.9–12.7)
POTASSIUM SERPL-SCNC: 3.7 MMOL/L (ref 3.5–5.3)
PROT SERPL-MCNC: 7.7 G/DL (ref 6.4–8.4)
RBC # BLD AUTO: 4.51 MILLION/UL (ref 3.81–5.12)
SODIUM SERPL-SCNC: 139 MMOL/L (ref 135–147)
TRIGL SERPL-MCNC: 67 MG/DL (ref ?–150)
WBC # BLD AUTO: 7.94 THOUSAND/UL (ref 4.31–10.16)

## 2025-04-01 PROCEDURE — 36415 COLL VENOUS BLD VENIPUNCTURE: CPT

## 2025-04-01 PROCEDURE — 85025 COMPLETE CBC W/AUTO DIFF WBC: CPT

## 2025-04-01 PROCEDURE — 80061 LIPID PANEL: CPT

## 2025-04-01 PROCEDURE — 80053 COMPREHEN METABOLIC PANEL: CPT

## 2025-04-04 ENCOUNTER — RESULTS FOLLOW-UP (OUTPATIENT)
Dept: FAMILY MEDICINE CLINIC | Facility: CLINIC | Age: 45
End: 2025-04-04

## 2025-04-30 DIAGNOSIS — E66.811 CLASS 1 OBESITY DUE TO EXCESS CALORIES WITH SERIOUS COMORBIDITY AND BODY MASS INDEX (BMI) OF 31.0 TO 31.9 IN ADULT: Primary | ICD-10-CM

## 2025-04-30 DIAGNOSIS — E66.09 CLASS 1 OBESITY DUE TO EXCESS CALORIES WITH SERIOUS COMORBIDITY AND BODY MASS INDEX (BMI) OF 31.0 TO 31.9 IN ADULT: Primary | ICD-10-CM

## 2025-04-30 RX ORDER — TIRZEPATIDE 2.5 MG/.5ML
2.5 INJECTION, SOLUTION SUBCUTANEOUS WEEKLY
Qty: 2 ML | Refills: 0 | Status: SHIPPED | OUTPATIENT
Start: 2025-04-30 | End: 2025-05-28

## 2025-04-30 NOTE — PROGRESS NOTES
1. Class 1 obesity due to excess calories with serious comorbidity and body mass index (BMI) of 31.0 to 31.9 in adult (Primary)  Patient with RENATO diagnosed by Sleep study. Requesting Authorization by her insurance.  - tirzepatide (Zepbound) 2.5 mg/0.5 mL auto-injector; Inject 0.5 mL (2.5 mg total) under the skin once a week for 28 days  Dispense: 2 mL; Refill: 0

## 2025-06-19 ENCOUNTER — OFFICE VISIT (OUTPATIENT)
Dept: FAMILY MEDICINE CLINIC | Facility: CLINIC | Age: 45
End: 2025-06-19
Payer: COMMERCIAL

## 2025-06-19 VITALS
WEIGHT: 172 LBS | HEART RATE: 92 BPM | DIASTOLIC BLOOD PRESSURE: 70 MMHG | TEMPERATURE: 97.8 F | OXYGEN SATURATION: 98 % | SYSTOLIC BLOOD PRESSURE: 120 MMHG | HEIGHT: 63 IN | BODY MASS INDEX: 30.48 KG/M2 | RESPIRATION RATE: 16 BRPM

## 2025-06-19 DIAGNOSIS — E66.09 CLASS 1 OBESITY DUE TO EXCESS CALORIES WITH SERIOUS COMORBIDITY AND BODY MASS INDEX (BMI) OF 30.0 TO 30.9 IN ADULT: Primary | ICD-10-CM

## 2025-06-19 DIAGNOSIS — Z12.31 ENCOUNTER FOR SCREENING MAMMOGRAM FOR MALIGNANT NEOPLASM OF BREAST: ICD-10-CM

## 2025-06-19 DIAGNOSIS — G47.33 OSA (OBSTRUCTIVE SLEEP APNEA): ICD-10-CM

## 2025-06-19 DIAGNOSIS — M79.605 LEFT LEG PAIN: ICD-10-CM

## 2025-06-19 DIAGNOSIS — E66.811 CLASS 1 OBESITY DUE TO EXCESS CALORIES WITH SERIOUS COMORBIDITY AND BODY MASS INDEX (BMI) OF 30.0 TO 30.9 IN ADULT: Primary | ICD-10-CM

## 2025-06-19 PROCEDURE — 99214 OFFICE O/P EST MOD 30 MIN: CPT | Performed by: FAMILY MEDICINE

## 2025-06-19 RX ORDER — GABAPENTIN 100 MG/1
100 CAPSULE ORAL 3 TIMES DAILY
Qty: 30 CAPSULE | Refills: 0 | Status: SHIPPED | OUTPATIENT
Start: 2025-06-19 | End: 2025-06-19 | Stop reason: SDUPTHER

## 2025-06-19 RX ORDER — TIRZEPATIDE 2.5 MG/.5ML
2.5 INJECTION, SOLUTION SUBCUTANEOUS WEEKLY
Qty: 2 ML | Refills: 0 | Status: SHIPPED | OUTPATIENT
Start: 2025-06-19 | End: 2025-06-19 | Stop reason: SDUPTHER

## 2025-06-19 RX ORDER — TIRZEPATIDE 2.5 MG/.5ML
2.5 INJECTION, SOLUTION SUBCUTANEOUS WEEKLY
Qty: 4 ML | Refills: 0 | Status: SHIPPED | COMMUNITY
Start: 2025-06-19 | End: 2025-08-14

## 2025-06-19 RX ORDER — GABAPENTIN 100 MG/1
100 CAPSULE ORAL
Qty: 30 CAPSULE | Refills: 0 | Status: SHIPPED | OUTPATIENT
Start: 2025-06-19

## 2025-06-19 NOTE — PROGRESS NOTES
Name: Carol Marin      : 1980      MRN: [id number]  Encounter Provider: Gwyn Lopez MD  Encounter Date: 2025   Encounter department: [facility] CARE [facility]    Assessment & Plan  Class 1 obesity due to excess calories with serious comorbidity and body mass index (BMI) of 30.0 to 30.9 in adult    Assessment and Plan:  1. Obesity with weight management:  - Patient reports weight loss of approximately 7-8 pounds while on previous medication, but has regained some weight  - Current weight 332.4 lbs (down 2.27% from previous visit)  - Patient experiencing increased food cravings and anxiety about eating  - Plan: Will prescribe 2.5 mg of weight management medication for 2 months  - Will submit letter to insurance with sleep study results showing mild sleep apnea (AHI 6) to attempt coverage approval  - If insurance denies, will provide prescription for 6 months total  - Follow up in 3 months to reassess weight management progress  Orders:    tirzepatide (Zepbound) 2.5 mg/0.5 mL auto-injector; Inject 0.5 mL (2.5 mg total) under the skin once a week    Left leg pain  2. Nocturnal leg discomfort/possible Restless Leg Syndrome:  - Patient reports leg discomfort primarily during early morning hours  - Previous medication caused significant headaches  - Differential includes venous insufficiency vs. restless leg syndrome vs. radiculopathy  - Plan: Trial of gabapentin at bedtime for neuropathic pain/RLS symptoms  - Consider vascular studies if symptoms persist  - If gabapentin ineffective and vascular studies negative, will refer to neurology for electromyography  - Patient instructed to message regarding medication efficacy  Orders:    VAS VENOUS DUPLEX -LOWER LIMB UNILATERAL; Future    gabapentin (Neurontin) 100 mg capsule; Take 1 capsule (100 mg total) by mouth daily at bedtime    RENATO (obstructive sleep apnea)  No need for Assistance, weight loss an, exercise and sleep in sides  "position  Orders:    tirzepatide (Zepbound) 2.5 mg/0.5 mL auto-injector; Inject 0.5 mL (2.5 mg total) under the skin once a week    Encounter for screening mammogram for malignant neoplasm of breast    Orders:    Mammo diagnostic bilateral w cad; Future       Follow-up for Weight Management and Leg Discomfort      Subjective:  Ms. Marin is a 45-year-old female presenting for follow-up regarding weight management and to discuss new concerns about leg discomfort. Patient reports she had been on weight management medication and lost approximately 7-8 pounds, noting her home scale showed a weight of around 165 lbs at one point. She states she felt \"deflated\" and noticed her clothes fitting more loosely. However, she reports the medication was not covered by her 's insurance plan despite having a sleep study showing mild sleep apnea (AHI of 6). Patient reports experiencing increased anxiety about eating and food cravings since stopping the medication, particularly for sweets. She states, \"I feel hungry but I tell myself I already ate, I shouldn't eat more, but the anxiety remains.\" Regarding her leg symptoms, patient describes discomfort primarily in the early morning hours that disturbs her sleep. She reports muscle tightness, heaviness, and discomfort in her legs that causes her to stretch and move them. The discomfort can progress to cramping with excessive stretching. She denies numbness but describes the sensation as a muscular pain or heaviness. 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 She mentions a previous accident with surgery and ongoing sensitivity in the affected area. Patient reports a previous medication trial for her leg symptoms caused significant headaches. She states she has had spine imaging which was reportedly normal, and has received injections for back pain in the past.    Objective:  /70 (BP Location: Left arm, Patient Position: Sitting, Cuff Size: " "Standard)   Pulse 92   Temp 97.8 °F (36.6 °C) (Tympanic)   Resp 16   Ht 5' 3\" (1.6 m)   Wt 78 kg (172 lb)   SpO2 98%   BMI 30.47 kg/m²       Physical Examination:  The patient appears to without distress; HEENT is unremarkable, the neck has no masses or enlarged lymph nodes. Respiratory effort is normal. Lung fields are clear; the heart has a normal rhythm without murmurs. Abdomen soft without abnormalities;  Neurological with no focal deficits.  Venous insufficiency, varicose veins in Left Leg.    Weight: 4 lbs (down from previous visit)  - Weight loss of 2.27% from previous measurement  - Patient reports greater weight loss of 7-8 lbs at home    Laboratory/Diagnostic Results:  - Sleep study: Mild sleep apnea with AHI of 6 (below insurance threshold of 15)  - Previous ferritin levels reportedly normal (specific values not available)  - Previous spine imaging reportedly normal    Additional Notes:  This encounter was conducted primarily in Amharic. 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 Patient has insurance through her 's plan which has denied coverage for weight management medication. Patient reports history of an accident requiring surgery with ongoing sensitivity in the affected area.          "

## 2025-07-30 ENCOUNTER — HOSPITAL ENCOUNTER (OUTPATIENT)
Dept: NON INVASIVE DIAGNOSTICS | Facility: HOSPITAL | Age: 45
Discharge: HOME | End: 2025-07-30
Attending: FAMILY MEDICINE
Payer: COMMERCIAL

## 2025-07-30 DIAGNOSIS — M79.605 LEFT LEG PAIN: ICD-10-CM

## 2025-07-30 PROCEDURE — 93971 EXTREMITY STUDY: CPT

## 2025-07-30 PROCEDURE — 93971 EXTREMITY STUDY: CPT | Performed by: SURGERY
